# Patient Record
Sex: MALE | Race: WHITE | NOT HISPANIC OR LATINO | Employment: FULL TIME | URBAN - METROPOLITAN AREA
[De-identification: names, ages, dates, MRNs, and addresses within clinical notes are randomized per-mention and may not be internally consistent; named-entity substitution may affect disease eponyms.]

---

## 2021-06-30 ENCOUNTER — HOSPITAL ENCOUNTER (EMERGENCY)
Facility: HOSPITAL | Age: 56
Discharge: HOME | End: 2021-06-30
Attending: EMERGENCY MEDICINE
Payer: MEDICAID

## 2021-06-30 VITALS
TEMPERATURE: 98 F | DIASTOLIC BLOOD PRESSURE: 74 MMHG | OXYGEN SATURATION: 97 % | HEART RATE: 103 BPM | RESPIRATION RATE: 16 BRPM | WEIGHT: 175 LBS | BODY MASS INDEX: 24.5 KG/M2 | HEIGHT: 71 IN | SYSTOLIC BLOOD PRESSURE: 157 MMHG

## 2021-06-30 DIAGNOSIS — J18.9 PNEUMONIA OF LEFT LOWER LOBE DUE TO INFECTIOUS ORGANISM: Primary | ICD-10-CM

## 2021-06-30 LAB
ANION GAP SERPL CALC-SCNC: 13 MMOL/L (ref 8–16)
BASOPHILS # BLD AUTO: 0.04 K/UL (ref 0–0.2)
BASOPHILS NFR BLD: 0.4 % (ref 0–1.9)
BUN SERPL-MCNC: 12 MG/DL (ref 6–20)
CALCIUM SERPL-MCNC: 10 MG/DL (ref 8.7–10.5)
CHLORIDE SERPL-SCNC: 95 MMOL/L (ref 95–110)
CO2 SERPL-SCNC: 23 MMOL/L (ref 23–29)
CREAT SERPL-MCNC: 0.9 MG/DL (ref 0.5–1.4)
DIFFERENTIAL METHOD: ABNORMAL
EOSINOPHIL # BLD AUTO: 0 K/UL (ref 0–0.5)
EOSINOPHIL NFR BLD: 0.4 % (ref 0–8)
ERYTHROCYTE [DISTWIDTH] IN BLOOD BY AUTOMATED COUNT: 12.5 % (ref 11.5–14.5)
EST. GFR  (AFRICAN AMERICAN): >60 ML/MIN/1.73 M^2
EST. GFR  (NON AFRICAN AMERICAN): >60 ML/MIN/1.73 M^2
GLUCOSE SERPL-MCNC: 112 MG/DL (ref 70–110)
HCT VFR BLD AUTO: 46.2 % (ref 40–54)
HGB BLD-MCNC: 15.6 G/DL (ref 14–18)
IMM GRANULOCYTES # BLD AUTO: 0.05 K/UL (ref 0–0.04)
IMM GRANULOCYTES NFR BLD AUTO: 0.5 % (ref 0–0.5)
LACTATE SERPL-SCNC: 1.3 MMOL/L (ref 0.5–2.2)
LYMPHOCYTES # BLD AUTO: 0.5 K/UL (ref 1–4.8)
LYMPHOCYTES NFR BLD: 4.8 % (ref 18–48)
MCH RBC QN AUTO: 32.3 PG (ref 27–31)
MCHC RBC AUTO-ENTMCNC: 33.8 G/DL (ref 32–36)
MCV RBC AUTO: 96 FL (ref 82–98)
MONOCYTES # BLD AUTO: 0.5 K/UL (ref 0.3–1)
MONOCYTES NFR BLD: 5 % (ref 4–15)
NEUTROPHILS # BLD AUTO: 9.3 K/UL (ref 1.8–7.7)
NEUTROPHILS NFR BLD: 88.9 % (ref 38–73)
NRBC BLD-RTO: 0 /100 WBC
PLATELET # BLD AUTO: 246 K/UL (ref 150–450)
PMV BLD AUTO: 9.6 FL (ref 9.2–12.9)
POTASSIUM SERPL-SCNC: 3.9 MMOL/L (ref 3.5–5.1)
RBC # BLD AUTO: 4.83 M/UL (ref 4.6–6.2)
SODIUM SERPL-SCNC: 131 MMOL/L (ref 136–145)
WBC # BLD AUTO: 10.45 K/UL (ref 3.9–12.7)

## 2021-06-30 PROCEDURE — 63600175 PHARM REV CODE 636 W HCPCS: Performed by: EMERGENCY MEDICINE

## 2021-06-30 PROCEDURE — 96374 THER/PROPH/DIAG INJ IV PUSH: CPT

## 2021-06-30 PROCEDURE — 36415 COLL VENOUS BLD VENIPUNCTURE: CPT | Performed by: EMERGENCY MEDICINE

## 2021-06-30 PROCEDURE — 25000003 PHARM REV CODE 250: Performed by: EMERGENCY MEDICINE

## 2021-06-30 PROCEDURE — 80048 BASIC METABOLIC PNL TOTAL CA: CPT | Performed by: EMERGENCY MEDICINE

## 2021-06-30 PROCEDURE — 96361 HYDRATE IV INFUSION ADD-ON: CPT

## 2021-06-30 PROCEDURE — 99284 EMERGENCY DEPT VISIT MOD MDM: CPT | Mod: 25

## 2021-06-30 PROCEDURE — 85025 COMPLETE CBC W/AUTO DIFF WBC: CPT | Performed by: EMERGENCY MEDICINE

## 2021-06-30 PROCEDURE — 83605 ASSAY OF LACTIC ACID: CPT | Performed by: EMERGENCY MEDICINE

## 2021-06-30 RX ORDER — AMOXICILLIN 500 MG/1
1000 CAPSULE ORAL 3 TIMES DAILY
Qty: 30 CAPSULE | Refills: 0 | Status: SHIPPED | OUTPATIENT
Start: 2021-06-30 | End: 2021-07-05

## 2021-06-30 RX ORDER — ACETAMINOPHEN 500 MG
1000 TABLET ORAL
Status: COMPLETED | OUTPATIENT
Start: 2021-06-30 | End: 2021-06-30

## 2021-06-30 RX ORDER — KETOROLAC TROMETHAMINE 30 MG/ML
10 INJECTION, SOLUTION INTRAMUSCULAR; INTRAVENOUS
Status: COMPLETED | OUTPATIENT
Start: 2021-06-30 | End: 2021-06-30

## 2021-06-30 RX ADMIN — KETOROLAC TROMETHAMINE 10 MG: 30 INJECTION, SOLUTION INTRAMUSCULAR; INTRAVENOUS at 12:06

## 2021-06-30 RX ADMIN — ACETAMINOPHEN 1000 MG: 500 TABLET, COATED ORAL at 12:06

## 2021-06-30 RX ADMIN — SODIUM CHLORIDE 1000 ML: 0.9 INJECTION, SOLUTION INTRAVENOUS at 12:06

## 2023-01-26 ENCOUNTER — HOSPITAL ENCOUNTER (EMERGENCY)
Facility: HOSPITAL | Age: 58
Discharge: HOME | End: 2023-01-26
Attending: EMERGENCY MEDICINE
Payer: MEDICAID

## 2023-01-26 VITALS
WEIGHT: 170 LBS | OXYGEN SATURATION: 98 % | HEIGHT: 71 IN | BODY MASS INDEX: 23.8 KG/M2 | RESPIRATION RATE: 17 BRPM | SYSTOLIC BLOOD PRESSURE: 129 MMHG | TEMPERATURE: 98 F | DIASTOLIC BLOOD PRESSURE: 72 MMHG | HEART RATE: 101 BPM
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
## 2023-01-26 PROCEDURE — 99282 EMERGENCY DEPT VISIT SF MDM: CPT
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
## 2023-01-26 NOTE — Clinical Note
"Chepe Lozano" Rupehs was seen and treated in our emergency department on 1/26/2023.  He may return to work on 01/27/2023.       If you have any questions or concerns, please don't hesitate to call.      Martina Solorio PA-C"

## 2023-05-28 ENCOUNTER — OFFICE VISIT (OUTPATIENT)
Dept: URGENT CARE | Facility: CLINIC | Age: 58
End: 2023-05-28
Payer: MEDICAID

## 2023-05-28 VITALS
DIASTOLIC BLOOD PRESSURE: 81 MMHG | TEMPERATURE: 97 F | BODY MASS INDEX: 22.96 KG/M2 | SYSTOLIC BLOOD PRESSURE: 140 MMHG | OXYGEN SATURATION: 96 % | WEIGHT: 164 LBS | RESPIRATION RATE: 16 BRPM | HEART RATE: 110 BPM | HEIGHT: 71 IN

## 2023-05-28 DIAGNOSIS — S61.412A LACERATION OF LEFT HAND, FOREIGN BODY PRESENCE UNSPECIFIED, INITIAL ENCOUNTER: Primary | ICD-10-CM

## 2023-05-28 PROCEDURE — 99213 OFFICE O/P EST LOW 20 MIN: CPT | Mod: S$GLB,,, | Performed by: NURSE PRACTITIONER

## 2023-05-28 PROCEDURE — 99213 PR OFFICE/OUTPT VISIT, EST, LEVL III, 20-29 MIN: ICD-10-PCS | Mod: S$GLB,,, | Performed by: NURSE PRACTITIONER

## 2023-05-28 RX ORDER — MUPIROCIN 20 MG/G
OINTMENT TOPICAL 3 TIMES DAILY
Qty: 22 G | Refills: 0 | Status: SHIPPED | OUTPATIENT
Start: 2023-05-28 | End: 2023-06-04

## 2023-05-28 NOTE — PATIENT INSTRUCTIONS
Keep wound clean and covered until healed  Wash wound with clean, soapy water and change dressing daily  Apply mupirocin ointment 2-3 x daily and with dressing changes  Follow up with PCP  Go directly to the er for any evidence of infection  Return to clinic for new concerns.

## 2023-05-28 NOTE — PROGRESS NOTES
"Subjective:      Patient ID: Chepe Goddard is a 57 y.o. male.    Vitals:  height is 5' 11" (1.803 m) and weight is 74.4 kg (164 lb). His temperature is 97 °F (36.1 °C). His blood pressure is 140/81 (abnormal) and his pulse is 110. His respiration is 16 and oxygen saturation is 96%.     Chief Complaint: Letter for School/Work    57-year-old female seen today requesting a return to work excuse.  States approximately 1 week ago he was cutting some bamboo with a bush knife and struck his left hand.  Denies seeking treatment prior to today and treated wound at home.  States last Tdap was about a year ago.    Constitution: Negative for chills and fever.   Cardiovascular:  Negative for chest pain, palpitations and sob on exertion.   Respiratory:  Negative for shortness of breath and stridor.    Musculoskeletal:  Negative for pain.   Skin:  Positive for laceration and erythema. Negative for wound.   Neurological:  Negative for dizziness, light-headedness, passing out, disorientation and altered mental status.   Psychiatric/Behavioral:  Negative for altered mental status, disorientation and confusion.     Objective:     Physical Exam   Constitutional: He is oriented to person, place, and time. He appears well-developed. He is cooperative.  Non-toxic appearance. He does not appear ill. No distress.   HENT:   Head: Normocephalic and atraumatic.   Ears:   Right Ear: External ear normal.   Left Ear: External ear normal.   Nose: Nose normal.   Mouth/Throat: Oropharynx is clear and moist and mucous membranes are normal. Mucous membranes are moist. Oropharynx is clear.   Eyes: Conjunctivae and lids are normal. No scleral icterus.   Neck: Trachea normal and phonation normal. Neck supple.   Cardiovascular: Normal rate, regular rhythm, normal heart sounds and normal pulses.   Pulmonary/Chest: Effort normal and breath sounds normal. No stridor. No respiratory distress.   Abdominal: Normal appearance.   Musculoskeletal:       "   General: No deformity.      Left hand: He exhibits normal capillary refill. Motor /Testing: The patient has normal left wrist strength.        Hands:    Neurological: no focal deficit. He is alert and oriented to person, place, and time. He has normal strength and normal reflexes. No sensory deficit.   Skin: Skin is warm, dry, intact and not diaphoretic. Capillary refill takes 2 to 3 seconds. erythema   Psychiatric: His speech is normal and behavior is normal. Judgment and thought content normal.   Nursing note and vitals reviewed.    Assessment:     1. Laceration of left hand, foreign body presence unspecified, initial encounter        Plan:       Laceration of left hand, foreign body presence unspecified, initial encounter  -     mupirocin (BACTROBAN) 2 % ointment; Apply topically 3 (three) times daily. for 7 days  Dispense: 22 g; Refill: 0      I have discussed the physical exam findings with the patient. He is requesting to return to work. We discussed symptom monitoring, conservative care methods, wound care and hygiene instructions, medication use, and follow up orders. He verbalized understanding and agreement with the plan of care.

## 2023-05-28 NOTE — LETTER
May 28, 2023      [facility]  [address]  [place] [address]  Phone: [phone]       Patient: Chepe Goddard   YOB: 1965  Date of Visit: 05/28/2023    To Whom It May Concern:    Catie Goddard  was at [facility] for initial visit on 05/28/2023 requesting a work excuse. The patient has no obvious limiting injury and may return to work on 05/28/2023 with no restrictions. If you have any questions or concerns, or if I can be of further assistance, please do not hesitate to contact me.    Sincerely,    Garrett Khalil Jr., KELBYP-C

## 2024-08-26 ENCOUNTER — HOSPITAL ENCOUNTER (OUTPATIENT)
Facility: HOSPITAL | Age: 59
Discharge: HOME | End: 2024-08-27
Attending: EMERGENCY MEDICINE | Admitting: INTERNAL MEDICINE

## 2024-08-26 DIAGNOSIS — F10.10 ALCOHOL ABUSE: ICD-10-CM

## 2024-08-26 DIAGNOSIS — F17.200 NEEDS SMOKING CESSATION EDUCATION: ICD-10-CM

## 2024-08-26 DIAGNOSIS — F19.10 SUBSTANCE ABUSE: ICD-10-CM

## 2024-08-26 DIAGNOSIS — R07.9 CHEST PAIN: ICD-10-CM

## 2024-08-26 DIAGNOSIS — R56.9 SEIZURES: ICD-10-CM

## 2024-08-26 DIAGNOSIS — R40.20 LOSS OF CONSCIOUSNESS: Primary | ICD-10-CM

## 2024-08-26 PROBLEM — F10.20 ALCOHOLISM: Status: ACTIVE | Noted: 2024-08-26

## 2024-08-26 LAB
ALBUMIN SERPL BCP-MCNC: 4.1 G/DL (ref 3.5–5.2)
ALP SERPL-CCNC: 67 U/L (ref 55–135)
ALT SERPL W/O P-5'-P-CCNC: 10 U/L (ref 10–44)
AMPHET+METHAMPHET UR QL: ABNORMAL
ANION GAP SERPL CALC-SCNC: 11 MMOL/L (ref 8–16)
APAP SERPL-MCNC: 0.1 UG/ML (ref 10–20)
AST SERPL-CCNC: 15 U/L (ref 10–40)
BARBITURATES UR QL SCN>200 NG/ML: NEGATIVE
BASOPHILS # BLD AUTO: 0.06 K/UL (ref 0–0.2)
BASOPHILS NFR BLD: 0.6 % (ref 0–1.9)
BENZODIAZ UR QL SCN>200 NG/ML: NEGATIVE
BILIRUB SERPL-MCNC: 0.3 MG/DL (ref 0.1–1)
BILIRUB UR QL STRIP: NEGATIVE
BNP SERPL-MCNC: 12 PG/ML (ref 0–99)
BUN SERPL-MCNC: 16 MG/DL (ref 6–20)
BZE UR QL SCN: NEGATIVE
CALCIUM SERPL-MCNC: 9 MG/DL (ref 8.7–10.5)
CANNABINOIDS UR QL SCN: ABNORMAL
CHLORIDE SERPL-SCNC: 107 MMOL/L (ref 95–110)
CK SERPL-CCNC: 36 U/L (ref 20–200)
CLARITY UR: CLEAR
CO2 SERPL-SCNC: 23 MMOL/L (ref 23–29)
COLOR UR: YELLOW
CREAT SERPL-MCNC: 1 MG/DL (ref 0.5–1.4)
CREAT UR-MCNC: 246.4 MG/DL (ref 23–375)
DIFFERENTIAL METHOD BLD: ABNORMAL
EOSINOPHIL # BLD AUTO: 0.1 K/UL (ref 0–0.5)
EOSINOPHIL NFR BLD: 0.5 % (ref 0–8)
ERYTHROCYTE [DISTWIDTH] IN BLOOD BY AUTOMATED COUNT: 13.6 % (ref 11.5–14.5)
EST. GFR  (NO RACE VARIABLE): >60 ML/MIN/1.73 M^2
ETHANOL SERPL-MCNC: <10 MG/DL
GLUCOSE SERPL-MCNC: 113 MG/DL (ref 70–110)
GLUCOSE UR QL STRIP: ABNORMAL
HCT VFR BLD AUTO: 46.6 % (ref 40–54)
HGB BLD-MCNC: 15 G/DL (ref 14–18)
HGB UR QL STRIP: NEGATIVE
IMM GRANULOCYTES # BLD AUTO: 0.03 K/UL (ref 0–0.04)
IMM GRANULOCYTES NFR BLD AUTO: 0.3 % (ref 0–0.5)
INR PPP: 0.9 (ref 0.8–1.2)
KETONES UR QL STRIP: ABNORMAL
LEUKOCYTE ESTERASE UR QL STRIP: NEGATIVE
LYMPHOCYTES # BLD AUTO: 1.3 K/UL (ref 1–4.8)
LYMPHOCYTES NFR BLD: 13 % (ref 18–48)
MAGNESIUM SERPL-MCNC: 2.2 MG/DL (ref 1.6–2.6)
MCH RBC QN AUTO: 30.1 PG (ref 27–31)
MCHC RBC AUTO-ENTMCNC: 32.2 G/DL (ref 32–36)
MCV RBC AUTO: 94 FL (ref 82–98)
MONOCYTES # BLD AUTO: 0.6 K/UL (ref 0.3–1)
MONOCYTES NFR BLD: 5.7 % (ref 4–15)
NEUTROPHILS # BLD AUTO: 8.1 K/UL (ref 1.8–7.7)
NEUTROPHILS NFR BLD: 79.9 % (ref 38–73)
NITRITE UR QL STRIP: NEGATIVE
NRBC BLD-RTO: 0 /100 WBC
OPIATES UR QL SCN: NEGATIVE
PCP UR QL SCN>25 NG/ML: NEGATIVE
PH UR STRIP: 6 [PH] (ref 5–8)
PHOSPHATE SERPL-MCNC: 2.9 MG/DL (ref 2.7–4.5)
PLATELET # BLD AUTO: 348 K/UL (ref 150–450)
PMV BLD AUTO: 9.9 FL (ref 9.2–12.9)
POTASSIUM SERPL-SCNC: 4.3 MMOL/L (ref 3.5–5.1)
PROT SERPL-MCNC: 6.9 G/DL (ref 6–8.4)
PROT UR QL STRIP: ABNORMAL
PROTHROMBIN TIME: 10.3 SEC (ref 9–12.5)
RBC # BLD AUTO: 4.98 M/UL (ref 4.6–6.2)
SALICYLATES SERPL-MCNC: <1.5 MG/DL (ref 15–30)
SARS-COV-2 RDRP RESP QL NAA+PROBE: NEGATIVE
SODIUM SERPL-SCNC: 141 MMOL/L (ref 136–145)
SP GR UR STRIP: >1.03 (ref 1–1.03)
TOXICOLOGY INFORMATION: ABNORMAL
TROPONIN I SERPL HS-MCNC: 3.8 PG/ML (ref 0–14.9)
TSH SERPL DL<=0.005 MIU/L-ACNC: 1.22 UIU/ML (ref 0.34–5.6)
URN SPEC COLLECT METH UR: ABNORMAL
UROBILINOGEN UR STRIP-ACNC: NEGATIVE EU/DL
WBC # BLD AUTO: 10.17 K/UL (ref 3.9–12.7)

## 2024-08-26 PROCEDURE — 25000003 PHARM REV CODE 250: Performed by: INTERNAL MEDICINE

## 2024-08-26 PROCEDURE — 93010 ELECTROCARDIOGRAM REPORT: CPT | Mod: ,,, | Performed by: INTERNAL MEDICINE

## 2024-08-26 PROCEDURE — 84484 ASSAY OF TROPONIN QUANT: CPT | Performed by: EMERGENCY MEDICINE

## 2024-08-26 PROCEDURE — 80143 DRUG ASSAY ACETAMINOPHEN: CPT | Performed by: EMERGENCY MEDICINE

## 2024-08-26 PROCEDURE — 83880 ASSAY OF NATRIURETIC PEPTIDE: CPT | Performed by: EMERGENCY MEDICINE

## 2024-08-26 PROCEDURE — 80053 COMPREHEN METABOLIC PANEL: CPT | Performed by: EMERGENCY MEDICINE

## 2024-08-26 PROCEDURE — 84100 ASSAY OF PHOSPHORUS: CPT | Performed by: EMERGENCY MEDICINE

## 2024-08-26 PROCEDURE — 63600175 PHARM REV CODE 636 W HCPCS: Performed by: INTERNAL MEDICINE

## 2024-08-26 PROCEDURE — 82077 ASSAY SPEC XCP UR&BREATH IA: CPT | Performed by: EMERGENCY MEDICINE

## 2024-08-26 PROCEDURE — G0378 HOSPITAL OBSERVATION PER HR: HCPCS

## 2024-08-26 PROCEDURE — 80307 DRUG TEST PRSMV CHEM ANLYZR: CPT | Performed by: EMERGENCY MEDICINE

## 2024-08-26 PROCEDURE — 99285 EMERGENCY DEPT VISIT HI MDM: CPT | Mod: 25

## 2024-08-26 PROCEDURE — 81003 URINALYSIS AUTO W/O SCOPE: CPT | Mod: 59 | Performed by: EMERGENCY MEDICINE

## 2024-08-26 PROCEDURE — 85025 COMPLETE CBC W/AUTO DIFF WBC: CPT | Performed by: EMERGENCY MEDICINE

## 2024-08-26 PROCEDURE — A9585 GADOBUTROL INJECTION: HCPCS | Performed by: INTERNAL MEDICINE

## 2024-08-26 PROCEDURE — 25500020 PHARM REV CODE 255: Performed by: INTERNAL MEDICINE

## 2024-08-26 PROCEDURE — 83735 ASSAY OF MAGNESIUM: CPT | Performed by: EMERGENCY MEDICINE

## 2024-08-26 PROCEDURE — 84443 ASSAY THYROID STIM HORMONE: CPT | Performed by: EMERGENCY MEDICINE

## 2024-08-26 PROCEDURE — 96374 THER/PROPH/DIAG INJ IV PUSH: CPT

## 2024-08-26 PROCEDURE — U0002 COVID-19 LAB TEST NON-CDC: HCPCS | Performed by: EMERGENCY MEDICINE

## 2024-08-26 PROCEDURE — 80179 DRUG ASSAY SALICYLATE: CPT | Performed by: EMERGENCY MEDICINE

## 2024-08-26 PROCEDURE — 85610 PROTHROMBIN TIME: CPT | Performed by: EMERGENCY MEDICINE

## 2024-08-26 PROCEDURE — 82550 ASSAY OF CK (CPK): CPT | Performed by: EMERGENCY MEDICINE

## 2024-08-26 PROCEDURE — 93005 ELECTROCARDIOGRAM TRACING: CPT | Performed by: INTERNAL MEDICINE

## 2024-08-26 PROCEDURE — 36415 COLL VENOUS BLD VENIPUNCTURE: CPT | Performed by: EMERGENCY MEDICINE

## 2024-08-26 RX ORDER — NALOXONE HCL 0.4 MG/ML
0.02 VIAL (ML) INJECTION
Status: DISCONTINUED | OUTPATIENT
Start: 2024-08-26 | End: 2024-08-27 | Stop reason: HOSPADM

## 2024-08-26 RX ORDER — LANOLIN ALCOHOL/MO/W.PET/CERES
800 CREAM (GRAM) TOPICAL
Status: DISCONTINUED | OUTPATIENT
Start: 2024-08-26 | End: 2024-08-27 | Stop reason: HOSPADM

## 2024-08-26 RX ORDER — ACETAMINOPHEN 325 MG/1
650 TABLET ORAL EVERY 4 HOURS PRN
Status: DISCONTINUED | OUTPATIENT
Start: 2024-08-26 | End: 2024-08-27 | Stop reason: HOSPADM

## 2024-08-26 RX ORDER — HYDROCODONE BITARTRATE AND ACETAMINOPHEN 5; 325 MG/1; MG/1
1 TABLET ORAL EVERY 6 HOURS PRN
Status: DISCONTINUED | OUTPATIENT
Start: 2024-08-26 | End: 2024-08-27 | Stop reason: HOSPADM

## 2024-08-26 RX ORDER — ALUMINUM HYDROXIDE, MAGNESIUM HYDROXIDE, AND SIMETHICONE 1200; 120; 1200 MG/30ML; MG/30ML; MG/30ML
30 SUSPENSION ORAL 4 TIMES DAILY PRN
Status: DISCONTINUED | OUTPATIENT
Start: 2024-08-26 | End: 2024-08-27 | Stop reason: HOSPADM

## 2024-08-26 RX ORDER — LORAZEPAM 2 MG/ML
1 INJECTION INTRAMUSCULAR
Status: DISCONTINUED | OUTPATIENT
Start: 2024-08-26 | End: 2024-08-27 | Stop reason: HOSPADM

## 2024-08-26 RX ORDER — TALC
6 POWDER (GRAM) TOPICAL NIGHTLY PRN
Status: DISCONTINUED | OUTPATIENT
Start: 2024-08-26 | End: 2024-08-27 | Stop reason: HOSPADM

## 2024-08-26 RX ORDER — SODIUM,POTASSIUM PHOSPHATES 280-250MG
2 POWDER IN PACKET (EA) ORAL
Status: DISCONTINUED | OUTPATIENT
Start: 2024-08-26 | End: 2024-08-27 | Stop reason: HOSPADM

## 2024-08-26 RX ORDER — GADOBUTROL 604.72 MG/ML
7.5 INJECTION INTRAVENOUS
Status: COMPLETED | OUTPATIENT
Start: 2024-08-26 | End: 2024-08-26

## 2024-08-26 RX ORDER — ACETAMINOPHEN 325 MG/1
650 TABLET ORAL EVERY 8 HOURS PRN
Status: DISCONTINUED | OUTPATIENT
Start: 2024-08-26 | End: 2024-08-26

## 2024-08-26 RX ORDER — FAMOTIDINE 20 MG/1
20 TABLET, FILM COATED ORAL 2 TIMES DAILY
Status: DISCONTINUED | OUTPATIENT
Start: 2024-08-26 | End: 2024-08-27 | Stop reason: HOSPADM

## 2024-08-26 RX ORDER — ONDANSETRON HYDROCHLORIDE 2 MG/ML
4 INJECTION, SOLUTION INTRAVENOUS EVERY 6 HOURS PRN
Status: DISCONTINUED | OUTPATIENT
Start: 2024-08-26 | End: 2024-08-27 | Stop reason: HOSPADM

## 2024-08-26 RX ADMIN — FAMOTIDINE 20 MG: 20 TABLET ORAL at 09:08

## 2024-08-26 RX ADMIN — FOLIC ACID: 5 INJECTION, SOLUTION INTRAMUSCULAR; INTRAVENOUS; SUBCUTANEOUS at 10:08

## 2024-08-26 RX ADMIN — GADOBUTROL 7.5 ML: 604.72 INJECTION INTRAVENOUS at 07:08

## 2024-08-26 NOTE — ASSESSMENT & PLAN NOTE
Suspected seizures  MRI W WO Contrast  EEG  Neurology Consult  Seizure precautions  Possible discharge from hospital within 24 hrs

## 2024-08-26 NOTE — FIRST PROVIDER EVALUATION
"Medical screening examination initiated.  I have conducted a focused provider triage encounter, findings are as follows:    Brief history of present illness:  Patient with syncope versus seizure-like activity occurring over the last couple of days    Vitals:    08/26/24 1025   BP: 104/62   Pulse: 98   Resp: 18   Temp: 97.2 °F (36.2 °C)   TempSrc: Oral   SpO2: 99%   Weight: 77.1 kg (170 lb)   Height: 5' 11" (1.803 m)       Pertinent physical exam:  No obvious focal deficits    Brief workup plan:  Syncope/seizure workup    Preliminary workup initiated; this workup will be continued and followed by the physician or advanced practice provider that is assigned to the patient when roomed.  "

## 2024-08-26 NOTE — H&P
[facility] - Emergency Dept  Hospital Medicine  History & Physical    Patient Name: Chepe Goddard  MRN: [id number]  Patient Class: OP- Observation  Admission Date: 8/26/2024  Attending Physician: Blake Cronin MD   Primary Care Provider: Armida, Primary Doctor         Patient information was obtained from patient and ER records.     Subjective:     Principal Problem:Seizures    Chief Complaint:   Chief Complaint   Patient presents with    Loss of Consciousness     Yesterday patient has one syncope episode with no postictal symptoms upon waking up - patient does not remember the event         HPI: 58 year old pt getting admitted with suspected seizures'  Pt drinks one pint of vodka everyday  Also has been using crystal meth everyday for past 9 years  Said he quit taking crystal meth for more than 1 week  But today UDS is positive again for amphetamines  He was taking OTC stackers/caffeine drinks for past few days to stay awake  Yesterday he passed out and this was witnessed by Girlfriend  EMS was called but pt refused ER visit  Toady he was found to be more sleepy and EMS was again called  Pt was escorted to ER and will be admitted to r/o seizures   Pt doesn't recall most of the events happened yesterday and today    Past Medical History:   Diagnosis Date    Alcoholism        Past Surgical History:   Procedure Laterality Date    NO PAST SURGERIES         Review of patient's allergies indicates:  No Known Allergies    No current facility-administered medications on file prior to encounter.     No current outpatient medications on file prior to encounter.     Family History       Problem Relation (Age of Onset)    Hypertension Mother          Tobacco Use    Smoking status: Every Day     Current packs/day: 1.00     Average packs/day: 1 pack/day for 0.7 years (0.7 ttl pk-yrs)     Types: Cigarettes     Start date: 1/1/2024    Smokeless tobacco: Not on file   Substance and Sexual Activity    Alcohol use:  Yes     Comment: occasionally    Drug use: Not on file    Sexual activity: Not on file     Review of Systems   Constitutional:  Negative for activity change and appetite change.   HENT:  Negative for congestion and dental problem.    Eyes:  Negative for discharge and itching.   Respiratory:  Negative for shortness of breath.    Cardiovascular:  Negative for chest pain.   Gastrointestinal:  Negative for abdominal distention and abdominal pain.   Endocrine: Negative for cold intolerance.   Genitourinary:  Negative for difficulty urinating and dysuria.   Musculoskeletal:  Negative for arthralgias and back pain.   Skin:  Negative for color change.   Neurological:  Negative for dizziness and facial asymmetry.   Hematological:  Negative for adenopathy.   Psychiatric/Behavioral:  Negative for agitation and behavioral problems.      Objective:     Vital Signs (Most Recent):  Temp: 97.2 °F (36.2 °C) (08/26/24 1025)  Pulse: 81 (08/26/24 1530)  Resp: 13 (08/26/24 1500)  BP: 132/63 (08/26/24 1530)  SpO2: 96 % (08/26/24 1530) Vital Signs (24h Range):  Temp:  [97.2 °F (36.2 °C)] 97.2 °F (36.2 °C)  Pulse:  [77-98] 81  Resp:  [13-18] 13  SpO2:  [94 %-100 %] 96 %  BP: (104-182)/(56-80) 132/63     Weight: 77.1 kg (169 lb 15.6 oz)  Body mass index is 23.71 kg/m².     Physical Exam  Vitals and nursing note reviewed.   Constitutional:       Appearance: He is well-developed.   HENT:      Head: Atraumatic.      Right Ear: External ear normal.      Left Ear: External ear normal.      Nose: Nose normal.      Mouth/Throat:      Mouth: Mucous membranes are dry.   Cardiovascular:      Rate and Rhythm: Normal rate.   Pulmonary:      Effort: Pulmonary effort is normal.   Abdominal:      Palpations: Abdomen is soft.   Musculoskeletal:         General: Normal range of motion.      Cervical back: Full passive range of motion without pain and normal range of motion.   Skin:     General: Skin is warm.   Neurological:      Mental Status: He is alert  and oriented to person, place, and time.   Psychiatric:         Behavior: Behavior normal.                Significant Labs: All pertinent labs within the past 24 hours have been reviewed.  CBC:   Recent Labs   Lab 08/26/24  1051   WBC 10.17   HGB 15.0   HCT 46.6        CMP:   Recent Labs   Lab 08/26/24  1051      K 4.3      CO2 23   *   BUN 16   CREATININE 1.0   CALCIUM 9.0   PROT 6.9   ALBUMIN 4.1   BILITOT 0.3   ALKPHOS 67   AST 15   ALT 10   ANIONGAP 11       Significant Imaging: I have reviewed all pertinent imaging results/findings within the past 24 hours.    Assessment/Plan:     * Seizures  Suspected seizures  MRI W WO Contrast  EEG  Neurology Consult  Seizure precautions  Possible discharge from hospital within 24 hrs      Alcoholism  CIWA score  Banana bag everyday       Polysubstance abuse  Aware         VTE Risk Mitigation (From admission, onward)           Ordered     IP VTE LOW RISK PATIENT  Once         08/26/24 1747     Place sequential compression device  Until discontinued         08/26/24 1747                       On 08/26/2024, patient should be placed in hospital observation services under my care.             Blake Cronin MD  Department of Hospital Medicine  [facility] - Emergency Dept

## 2024-08-26 NOTE — ED PROVIDER NOTES
"Encounter Date: 8/26/2024       History     Chief Complaint   Patient presents with    Loss of Consciousness     Yesterday patient has one syncope episode with no postictal symptoms upon waking up - patient does not remember the event      58-year-old male presents emergency department with reported seizure activity that occurred yesterday.  According to the patient he states that he drinks a proximally 1 pt of vodka daily mixed with Powerade he denies having any previous DTs, denies having to wake up and have a drink in the morning prior to going to work.  Patient reports that he does have a job in his able to perform his job daily.  The patient reports that he has not had any alcohol in the last 24 hours.  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  The patient has been supplementing his lack of crystal meth use with over-the-counter " stackers".  According to the patient's significant other he was sitting down at the table yesterday when he suddenly started grunting and his limbs were shaking, he had an episode of urinary incontinence, 911 was called however upon their arrival with the patient was awake and alert and refused transport and went to sleep.  According to the significant other he slept all night states that he got up this morning was attempting to go to work when he reported that he felt dizzy and felt as if something else were happening to him.  His significant other reports that he turned around suddenly and ran into the wall she helped him to the floor he did not have a complete syncopal episode nor any seizure-like activity however the patient does not remember all events.    The history is provided by the patient.     Review of patient's allergies indicates:  No Known Allergies  History reviewed. No pertinent past medical history.  History reviewed. No pertinent surgical " history.  No family history on file.  Social History     Tobacco Use    Smoking status: Every Day     Current packs/day: 1.00     Average packs/day: 1 pack/day for 0.7 years (0.7 ttl pk-yrs)     Types: Cigarettes     Start date: 1/1/2024   Substance Use Topics    Alcohol use: Yes     Comment: occasionally     Review of Systems   Constitutional:  Negative for chills and fever.   HENT: Negative.     Respiratory: Negative.     Cardiovascular: Negative.    Gastrointestinal: Negative.    Genitourinary: Negative.    Neurological:  Positive for dizziness and seizures.   Hematological: Negative.    Psychiatric/Behavioral: Negative.     All other systems reviewed and are negative.      Physical Exam     Initial Vitals [08/26/24 1025]   BP Pulse Resp Temp SpO2   104/62 98 18 97.2 °F (36.2 °C) 99 %      MAP       --         Physical Exam    Constitutional: He appears well-developed and well-nourished. He appears lethargic.   HENT:   Head: Normocephalic and atraumatic.   Eyes: EOM are normal. Pupils are equal, round, and reactive to light.   Pulmonary/Chest: Breath sounds normal.   Abdominal: Abdomen is soft. Bowel sounds are normal.     Neurological: He has normal strength. He appears lethargic. No sensory deficit. Coordination and gait normal. GCS eye subscore is 4. GCS verbal subscore is 5. GCS motor subscore is 6.         ED Course   Procedures  Labs Reviewed   CBC W/ AUTO DIFFERENTIAL - Abnormal       Result Value    WBC 10.17      RBC 4.98      Hemoglobin 15.0      Hematocrit 46.6      MCV 94      MCH 30.1      MCHC 32.2      RDW 13.6      Platelets 348      MPV 9.9      Immature Granulocytes 0.3      Gran # (ANC) 8.1 (*)     Immature Grans (Abs) 0.03      Lymph # 1.3      Mono # 0.6      Eos # 0.1      Baso # 0.06      nRBC 0      Gran % 79.9 (*)     Lymph % 13.0 (*)     Mono % 5.7      Eosinophil % 0.5      Basophil % 0.6      Differential Method Automated     COMPREHENSIVE METABOLIC PANEL - Abnormal    Sodium 141       Potassium 4.3      Chloride 107      CO2 23      Glucose 113 (*)     BUN 16      Creatinine 1.0      Calcium 9.0      Total Protein 6.9      Albumin 4.1      Total Bilirubin 0.3      Alkaline Phosphatase 67      AST 15      ALT 10      eGFR >60.0      Anion Gap 11     URINALYSIS, REFLEX TO URINE CULTURE - Abnormal    Specimen UA Urine, Clean Catch      Color, UA Yellow      Appearance, UA Clear      pH, UA 6.0      Specific Gravity, UA >1.030 (*)     Protein, UA Trace (*)     Glucose, UA 1+ (*)     Ketones, UA Trace (*)     Bilirubin (UA) Negative      Occult Blood UA Negative      Nitrite, UA Negative      Urobilinogen, UA Negative      Leukocytes, UA Negative      Narrative:     Specimen Source->Urine   DRUG SCREEN PANEL, URINE EMERGENCY - Abnormal    Benzodiazepines Negative      Cocaine (Metab.) Negative      Opiate Scrn, Ur Negative      Barbiturate Screen, Ur Negative      Amphetamine Screen, Ur Presumptive Positive (*)     THC Presumptive Positive (*)     Phencyclidine Negative      Creatinine, Urine 246.4      Toxicology Information SEE COMMENT      Narrative:     Specimen Source->Urine   ACETAMINOPHEN LEVEL - Abnormal    Acetaminophen (Tylenol), Serum 0.1 (*)    SALICYLATE LEVEL - Abnormal    Salicylate Lvl <1.5 (*)    PROTIME-INR    Prothrombin Time 10.3      INR 0.9     TSH    TSH 1.219     TROPONIN I HIGH SENSITIVITY    Troponin I High Sensitivity 3.8     SARS-COV-2 RNA AMPLIFICATION, QUAL    SARS-CoV-2 RNA, Amplification, Qual Negative     B-TYPE NATRIURETIC PEPTIDE    BNP 12     CK    CPK 36     ALCOHOL,MEDICAL (ETHANOL)   ACETAMINOPHEN LEVEL   ALCOHOL,MEDICAL (ETHANOL)    Alcohol, Serum <10     MAGNESIUM   PHOSPHORUS   PHOSPHORUS    Phosphorus 2.9     MAGNESIUM    Magnesium 2.2     SALICYLATE LEVEL        ECG Results              ECG 12 lead (In process)        Collection Time Result Time QRS Duration OHS QTC Calculation    08/26/24 11:15:12 08/26/24 13:19:11 84 447                     In process by  Interface, Lab In [facility] (08/26/24 13:19:14)                   Narrative:    Test Reason : R55,    Vent. Rate : 080 BPM     Atrial Rate : 080 BPM     P-R Int : 146 ms          QRS Dur : 084 ms      QT Int : 388 ms       P-R-T Axes : 073 078 101 degrees     QTc Int : 447 ms    Normal sinus rhythm  Normal ECG  No previous ECGs available    Referred By: [email or web address]   SELF           Confirmed By:                                   Imaging Results              CT Head Without Contrast (Final result)  Result time 08/26/24 11:06:54      Final result by Lidya Calix MD (08/26/24 11:06:54)                   Impression:      1. Normal CT appearance of the brain.      Electronically signed by: Lidya Calix  Date:    08/26/2024  Time:    11:06               Narrative:    EXAMINATION:  CT HEAD WITHOUT CONTRAST    CLINICAL HISTORY:  Neuro deficit, acute, stroke suspected;.    TECHNIQUE:  CMS MANDATED QUALITY DATA - CT RADIATION - 436    All CT scans at this facility utilize dose modulation, iterative reconstruction, and/or weight based dosing when appropriate to reduce radiation dose to as low as reasonably achievable.    Non infusion images were obtained from the skull base to the vertex.    COMPARISON:  None.    FINDINGS:  There is no evidence of intracranial mass, hemorrhage, or midline shift.  The ventricles and sulci are within normal limits.  There are no pathologic extra-axial fluid collections.    There is no evidence of ischemic change or edema.  Cerebellum and brainstem are unremarkable.    The calvarium is intact.                                       X-Ray Chest PA And Lateral (Final result)  Result time 08/26/24 10:56:07   Procedure changed from X-Ray Chest AP Portable     Final result by Lidya Calix MD (08/26/24 10:56:07)                   Impression:      Normal chest.      Electronically signed by: Lidya Calix  Date:    08/26/2024  Time:    10:56               Narrative:    EXAMINATION:  XR  CHEST PA AND LATERAL    CLINICAL HISTORY:  pain;Stroke;    FINDINGS:  PA and lateral chest is compared to 06/30/2021 shows normal cardiomediastinal silhouette.    Lungs are clear. Pulmonary vasculature is normal. No acute osseous abnormality.                                       Medications   melatonin tablet 6 mg (has no administration in time range)   ondansetron injection 4 mg (has no administration in time range)   aluminum-magnesium hydroxide-simethicone 200-200-20 mg/5 mL suspension 30 mL (has no administration in time range)   acetaminophen tablet 650 mg (has no administration in time range)   HYDROcodone-acetaminophen 5-325 mg per tablet 1 tablet (has no administration in time range)   naloxone 0.4 mg/mL injection 0.02 mg (has no administration in time range)   potassium bicarbonate disintegrating tablet 50 mEq (has no administration in time range)   potassium bicarbonate disintegrating tablet 35 mEq (has no administration in time range)   potassium bicarbonate disintegrating tablet 60 mEq (has no administration in time range)   magnesium oxide tablet 800 mg (has no administration in time range)   magnesium oxide tablet 800 mg (has no administration in time range)   potassium, sodium phosphates 280-160-250 mg packet 2 packet (has no administration in time range)   potassium, sodium phosphates 280-160-250 mg packet 2 packet (has no administration in time range)   potassium, sodium phosphates 280-160-250 mg packet 2 packet (has no administration in time range)   0.9% NaCl 1,000 mL with mvi, (ADULT) no.4 with vit K 3,300 unit- 150 mcg/10 mL 10 mL, thiamine 100 mg, folic acid 1 mg infusion (has no administration in time range)     Medical Decision Making  58-year-old male presents emergency department with reported seizure activity that occurred yesterday.  According to the patient he states that he drinks a proximally 1 pt of vodka daily mixed with Powerade he denies having any previous DTs, denies having to  "wake up and have a drink in the morning prior to going to work.  Patient reports that he does have a job in his able to perform his job daily.  The patient reports that he has not had any alcohol in the last 24 hours.  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  The patient has been supplementing his lack of crystal meth use with over-the-counter " stackers".  According to the patient's significant other he was sitting down at the table yesterday when he suddenly started grunting and his limbs were shaking, he had an episode of urinary incontinence, 911 was called however upon their arrival with the patient was awake and alert and refused transport and went to sleep.  According to the significant other he slept all night states that he got up this morning was attempting to go to work when he reported that he felt dizzy and felt as if something else were happening to him.  His significant other reports that he turned around suddenly and ran into the wall she helped him to the floor he did not have a complete syncopal episode nor any seizure-like activity however the patient does not remember all events.    The history is provided by the patient.     Considerations include but not limited to, DTs, alcohol abuse, new onset seizure, substance abuse, electrolyte abnormalities    58-year-old male presents emergency department with seizure-like activity that started yesterday had 1 occurrence last p.m. with incontinence of urine.  Patient does admit to daily alcohol use consisting of a pt of vodka he reports that he has had no alcohol in the last 24 hours and has had no previous DTs.  Patient currently not exhibiting any DT symptoms he has no tachycardia no fever, no tremors.  Patient has been somewhat lethargic while in the emergency department however he awakens easily with verbal stimulus he is awake " "alert oriented he has a witnessed steady gait he has no focal neuro deficits.  Further evaluation was taken in the emergency department secondary to new onset seizure history as well as a history of alcohol use, and substance abuse.  Patient does admit to using crystal meth for proximally 10 years he reports that he snores his crystal meth he states he has not taken any crystal crystal meth use with over-the-counter "stackers" ; however, patient's urine drug screen is positive for amphetamines and marijuana.  Patient's labs are unremarkable including no electrolyte abnormalities again urine tox is positive for amphetamines and marijuana, CT imaging of the brain is unremarkable.  Patient has had no seizure-like activity while in the emergency department.  I did consult Dr. Saunders neurology on-call, who would like the patient admitted for further evaluation including MRI of the brain with and without contrast.  I have given report to Hospital Medicine Dr. Cronin who will admit the patient.    Amount and/or Complexity of Data Reviewed  Labs: ordered. Decision-making details documented in ED Course.  Radiology: ordered. Decision-making details documented in ED Course.                                      Clinical Impression:  Final diagnoses:  [R40.20] Loss of consciousness (Primary)  [F19.10] Substance abuse  [F10.10] Alcohol abuse  [R56.9] Seizures          ED Disposition Condition    Observation                 Elizabeth Correa, [facility]  08/26/24 1811    "

## 2024-08-26 NOTE — SUBJECTIVE & OBJECTIVE
Past Medical History:   Diagnosis Date    Alcoholism        Past Surgical History:   Procedure Laterality Date    NO PAST SURGERIES         Review of patient's allergies indicates:  No Known Allergies    No current facility-administered medications on file prior to encounter.     No current outpatient medications on file prior to encounter.     Family History       Problem Relation (Age of Onset)    Hypertension Mother          Tobacco Use    Smoking status: Every Day     Current packs/day: 1.00     Average packs/day: 1 pack/day for 0.7 years (0.7 ttl pk-yrs)     Types: Cigarettes     Start date: 1/1/2024    Smokeless tobacco: Not on file   Substance and Sexual Activity    Alcohol use: Yes     Comment: occasionally    Drug use: Not on file    Sexual activity: Not on file     Review of Systems   Constitutional:  Negative for activity change and appetite change.   HENT:  Negative for congestion and dental problem.    Eyes:  Negative for discharge and itching.   Respiratory:  Negative for shortness of breath.    Cardiovascular:  Negative for chest pain.   Gastrointestinal:  Negative for abdominal distention and abdominal pain.   Endocrine: Negative for cold intolerance.   Genitourinary:  Negative for difficulty urinating and dysuria.   Musculoskeletal:  Negative for arthralgias and back pain.   Skin:  Negative for color change.   Neurological:  Negative for dizziness and facial asymmetry.   Hematological:  Negative for adenopathy.   Psychiatric/Behavioral:  Negative for agitation and behavioral problems.      Objective:     Vital Signs (Most Recent):  Temp: 97.2 °F (36.2 °C) (08/26/24 1025)  Pulse: 81 (08/26/24 1530)  Resp: 13 (08/26/24 1500)  BP: 132/63 (08/26/24 1530)  SpO2: 96 % (08/26/24 1530) Vital Signs (24h Range):  Temp:  [97.2 °F (36.2 °C)] 97.2 °F (36.2 °C)  Pulse:  [77-98] 81  Resp:  [13-18] 13  SpO2:  [94 %-100 %] 96 %  BP: (104-182)/(56-80) 132/63     Weight: 77.1 kg (169 lb 15.6 oz)  Body mass index is  23.71 kg/m².     Physical Exam  Vitals and nursing note reviewed.   Constitutional:       Appearance: He is well-developed.   HENT:      Head: Atraumatic.      Right Ear: External ear normal.      Left Ear: External ear normal.      Nose: Nose normal.      Mouth/Throat:      Mouth: Mucous membranes are dry.   Cardiovascular:      Rate and Rhythm: Normal rate.   Pulmonary:      Effort: Pulmonary effort is normal.   Abdominal:      Palpations: Abdomen is soft.   Musculoskeletal:         General: Normal range of motion.      Cervical back: Full passive range of motion without pain and normal range of motion.   Skin:     General: Skin is warm.   Neurological:      Mental Status: He is alert and oriented to person, place, and time.   Psychiatric:         Behavior: Behavior normal.                Significant Labs: All pertinent labs within the past 24 hours have been reviewed.  CBC:   Recent Labs   Lab 08/26/24  1051   WBC 10.17   HGB 15.0   HCT 46.6        CMP:   Recent Labs   Lab 08/26/24  1051      K 4.3      CO2 23   *   BUN 16   CREATININE 1.0   CALCIUM 9.0   PROT 6.9   ALBUMIN 4.1   BILITOT 0.3   ALKPHOS 67   AST 15   ALT 10   ANIONGAP 11       Significant Imaging: I have reviewed all pertinent imaging results/findings within the past 24 hours.

## 2024-08-26 NOTE — HPI
58 year old pt getting admitted with suspected seizures'  Pt drinks one pint of vodka everyday  Also has been using crystal meth everyday for past 9 years  Said he quit taking crystal meth for more than 1 week  But today UDS is positive again for amphetamines  He was taking OTC stackers/caffeine drinks for past few days to stay awake  Yesterday he passed out and this was witnessed by Girlfriend  EMS was called but pt refused ER visit  Toady he was found to be more sleepy and EMS was again called  Pt was escorted to ER and will be admitted to r/o seizures   Pt doesn't recall most of the events happened yesterday and today

## 2024-08-26 NOTE — Clinical Note
Diagnosis: Seizures [205091]   Future Attending Provider: DENYS PASCAL [[id number]]   Place in Observation: [facility] [0810]   Special Needs:: No Special Needs [1]

## 2024-08-27 VITALS
TEMPERATURE: 99 F | WEIGHT: 174.38 LBS | DIASTOLIC BLOOD PRESSURE: 79 MMHG | RESPIRATION RATE: 18 BRPM | BODY MASS INDEX: 23.62 KG/M2 | SYSTOLIC BLOOD PRESSURE: 143 MMHG | HEIGHT: 72 IN | OXYGEN SATURATION: 97 % | HEART RATE: 82 BPM

## 2024-08-27 LAB
ALBUMIN SERPL BCP-MCNC: 3.6 G/DL (ref 3.5–5.2)
ALP SERPL-CCNC: 59 U/L (ref 55–135)
ALT SERPL W/O P-5'-P-CCNC: 10 U/L (ref 10–44)
ANION GAP SERPL CALC-SCNC: 5 MMOL/L (ref 8–16)
AST SERPL-CCNC: 11 U/L (ref 10–40)
BASOPHILS # BLD AUTO: 0.06 K/UL (ref 0–0.2)
BASOPHILS NFR BLD: 0.8 % (ref 0–1.9)
BILIRUB SERPL-MCNC: 0.2 MG/DL (ref 0.1–1)
BUN SERPL-MCNC: 16 MG/DL (ref 6–20)
CALCIUM SERPL-MCNC: 8.5 MG/DL (ref 8.7–10.5)
CHLORIDE SERPL-SCNC: 111 MMOL/L (ref 95–110)
CO2 SERPL-SCNC: 27 MMOL/L (ref 23–29)
CREAT SERPL-MCNC: 0.8 MG/DL (ref 0.5–1.4)
DIFFERENTIAL METHOD BLD: NORMAL
EOSINOPHIL # BLD AUTO: 0.2 K/UL (ref 0–0.5)
EOSINOPHIL NFR BLD: 2.6 % (ref 0–8)
ERYTHROCYTE [DISTWIDTH] IN BLOOD BY AUTOMATED COUNT: 13.9 % (ref 11.5–14.5)
EST. GFR  (NO RACE VARIABLE): >60 ML/MIN/1.73 M^2
GLUCOSE SERPL-MCNC: 111 MG/DL (ref 70–110)
HCT VFR BLD AUTO: 44 % (ref 40–54)
HGB BLD-MCNC: 14.2 G/DL (ref 14–18)
IMM GRANULOCYTES # BLD AUTO: 0.02 K/UL (ref 0–0.04)
IMM GRANULOCYTES NFR BLD AUTO: 0.3 % (ref 0–0.5)
LYMPHOCYTES # BLD AUTO: 2.4 K/UL (ref 1–4.8)
LYMPHOCYTES NFR BLD: 32.8 % (ref 18–48)
MAGNESIUM SERPL-MCNC: 2 MG/DL (ref 1.6–2.6)
MCH RBC QN AUTO: 30.5 PG (ref 27–31)
MCHC RBC AUTO-ENTMCNC: 32.3 G/DL (ref 32–36)
MCV RBC AUTO: 94 FL (ref 82–98)
MONOCYTES # BLD AUTO: 0.5 K/UL (ref 0.3–1)
MONOCYTES NFR BLD: 7 % (ref 4–15)
NEUTROPHILS # BLD AUTO: 4.1 K/UL (ref 1.8–7.7)
NEUTROPHILS NFR BLD: 56.5 % (ref 38–73)
NRBC BLD-RTO: 0 /100 WBC
PLATELET # BLD AUTO: 322 K/UL (ref 150–450)
PMV BLD AUTO: 10.1 FL (ref 9.2–12.9)
POTASSIUM SERPL-SCNC: 4 MMOL/L (ref 3.5–5.1)
PROT SERPL-MCNC: 6 G/DL (ref 6–8.4)
RBC # BLD AUTO: 4.66 M/UL (ref 4.6–6.2)
SODIUM SERPL-SCNC: 143 MMOL/L (ref 136–145)
WBC # BLD AUTO: 7.32 K/UL (ref 3.9–12.7)

## 2024-08-27 PROCEDURE — 83735 ASSAY OF MAGNESIUM: CPT | Performed by: INTERNAL MEDICINE

## 2024-08-27 PROCEDURE — 85025 COMPLETE CBC W/AUTO DIFF WBC: CPT | Performed by: INTERNAL MEDICINE

## 2024-08-27 PROCEDURE — 36415 COLL VENOUS BLD VENIPUNCTURE: CPT | Performed by: INTERNAL MEDICINE

## 2024-08-27 PROCEDURE — G0378 HOSPITAL OBSERVATION PER HR: HCPCS

## 2024-08-27 PROCEDURE — 63600175 PHARM REV CODE 636 W HCPCS: Performed by: INTERNAL MEDICINE

## 2024-08-27 PROCEDURE — 80053 COMPREHEN METABOLIC PANEL: CPT | Performed by: INTERNAL MEDICINE

## 2024-08-27 PROCEDURE — 95819 EEG AWAKE AND ASLEEP: CPT

## 2024-08-27 PROCEDURE — 25000003 PHARM REV CODE 250: Performed by: INTERNAL MEDICINE

## 2024-08-27 PROCEDURE — 96376 TX/PRO/DX INJ SAME DRUG ADON: CPT

## 2024-08-27 RX ORDER — ASPIRIN 81 MG/1
81 TABLET ORAL DAILY
Qty: 90 TABLET | Refills: 3 | Status: SHIPPED | OUTPATIENT
Start: 2024-08-27 | End: 2025-08-27

## 2024-08-27 RX ORDER — ATORVASTATIN CALCIUM 40 MG/1
40 TABLET, FILM COATED ORAL DAILY
Qty: 90 TABLET | Refills: 3 | Status: SHIPPED | OUTPATIENT
Start: 2024-08-27 | End: 2025-08-27

## 2024-08-27 RX ADMIN — FOLIC ACID: 5 INJECTION, SOLUTION INTRAMUSCULAR; INTRAVENOUS; SUBCUTANEOUS at 09:08

## 2024-08-27 RX ADMIN — FAMOTIDINE 20 MG: 20 TABLET ORAL at 08:08

## 2024-08-27 NOTE — PROCEDURES
EEG REPORT    NAME: Chepe Goddard  : 1965  MRN: [id number]    DATE of EE2024    CLINICAL INDICATION: This is a 58 y.o. male with history of and drug use being evaluated for new onset seizure.    MEDICATIONS:    0.9% NaCl 1,000 mL with mvi, (ADULT) no.4 with vit K 3,300 unit- 150 mcg/10 mL 10 mL, thiamine 100 mg, folic acid 1 mg infusion   Intravenous Daily    famotidine  20 mg Oral BID         EEG DESCRIPTION:  A 16-channel EEG was performed with electrode placement in 10/20 International System. Longitudinal bipolar and referential montages were utilized in analysis.    This is a technically adequate study with minor muscle and motion artifacts.    The dominant posterior background rhythm was a well modulated, symmetric, synchronous, reactive, 8 Hz rhythm.    The record was reactive to eye opening and closure. Anterior derivations showed the expected admixture of low-voltage beta and theta frequencies as well as intermittent eye blink artifact.    Drowsiness was characterized by voltage attenuation and lateral eye movements.    Sleep architecture was symmetric and normal.    No epileptiform discharges were recorded. No electrographic or electroclinical seizures were recorded.    IMPRESSION: This is a normal EEG. No lateralizing or epileptiform features are noted. No electrographic or electroclinical seizures are recorded.      Evangelist Saunders MD  Neurology

## 2024-08-27 NOTE — DISCHARGE INSTRUCTIONS
No driving for now, no swimming alone, no climbing high areas, no operation of heavy machinery or working with high risk electricity equipment.     Follow up Neurology in 2 weeks. Call [phone] to make an appointment.

## 2024-08-27 NOTE — NURSING
"Nurses Note -- 4 Eyes      8/27/2024   3:48 AM      Skin assessed during: Admit    Patient refused. Patient educated on reasoning of skin assessment. Patient replies, "No thankyou, I dont have anything, I'm fine."           [] No Altered Skin Integrity Present    []Prevention Measures Documented      [] Yes- Altered Skin Integrity Present or Discovered   [] LDA Added if Not in Epic (Describe Wound)   [] New Altered Skin Integrity was Present on Admit and Documented in LDA   [] Wound Image Taken    Wound Care Consulted? No    Attending Nurse:  Ary Henao RN/Staff Member:            "

## 2024-08-27 NOTE — PLAN OF CARE
[facility]  Initial Discharge Assessment       Primary Care Provider: No, Primary Doctor    Admission Diagnosis: Seizures [R56.9]    Admission Date: 8/26/2024  Expected Discharge Date: 8/27/2024    Transition of Care Barriers: None    Payor: /     Extended Emergency Contact Information  Primary Emergency Contact: Sara Mota  Mobile Phone: [phone]  Relation: Mother  Preferred language: English   needed? No  Secondary Emergency Contact: Ora Goddard  Mobile Phone: [phone]  Relation: Spouse  Preferred language: English   needed? No    Discharge Plan A: Home  Discharge Plan B: Home with family      [facility]  [address]  [place] [address]  Phone: [phone] Fax: [phone]    [id number] - [place], [place] - [address] & [facility]  [address] [facility] [address]  Phone: [phone] Fax: [phone]    Patient assessment completed at bedside. Patient staying with his mother Sara who is his NOK.  Patient currently filling out the Medicaid application, will refer to the [facility] clinic. Patient will use the [facility].  NO dme/hh needs.     Initial Assessment (most recent)       Adult Discharge Assessment - 08/27/24 1045          Discharge Assessment    Assessment Type Discharge Planning Assessment     Confirmed/corrected address, phone number and insurance Yes     Confirmed Demographics Correct on Facesheet     Source of Information family;patient     When was your last doctors appointment? --   years    Reason For Admission seizures     People in Home alone;parent(s)     Facility Arrived From: home     Do you expect to return to your current living situation? Yes     Prior to hospitilization cognitive status: Alert/Oriented     Current cognitive status: Alert/Oriented     Walking or Climbing Stairs Difficulty no     Dressing/Bathing Difficulty no     Equipment Currently  Used at Home none     Readmission within 30 days? No     Patient currently being followed by outpatient case management? No     Do you currently have service(s) that help you manage your care at home? No     Do you take prescription medications? No     Do you have prescription coverage? No     Who is going to help you get home at discharge? mother Sara     How do you get to doctors appointments? family or friend will provide     Are you on dialysis? No     Do you take coumadin? No     Discharge Plan A Home     Discharge Plan B Home with family     DME Needed Upon Discharge  none     Discharge Plan discussed with: Patient;Parent(s)     Transition of Care Barriers None        Physical Activity    On average, how many days per week do you engage in moderate to strenuous exercise (like a brisk walk)? 0 days        Financial Resource Strain    How hard is it for you to pay for the very basics like food, housing, medical care, and heating? Somewhat hard        Housing Stability    In the last 12 months, was there a time when you were not able to pay the mortgage or rent on time? Patient declined     At any time in the past 12 months, were you homeless or living in a shelter (including now)? Patient declined        Transportation Needs    Has the lack of transportation kept you from medical appointments, meetings, work or from getting things needed for daily living? No        Food Insecurity    Within the past 12 months, you worried that your food would run out before you got the money to buy more. Never true     Within the past 12 months, the food you bought just didn't last and you didn't have money to get more. Never true        Stress    Do you feel stress - tense, restless, nervous, or anxious, or unable to sleep at night because your mind is troubled all the time - these days? Not at all        Social Isolation    How often do you feel lonely or isolated from those around you?  Never        Alcohol Use    Q1: How  often do you have a drink containing alcohol? Patient declined     Q2: How many drinks containing alcohol do you have on a typical day when you are drinking? Patient declined     Q3: How often do you have six or more drinks on one occasion? Patient declined        Utilities    In the past 12 months has the electric, gas, oil, or water company threatened to shut off services in your home? No        Health Literacy    How often do you need to have someone help you when you read instructions, pamphlets, or other written material from your doctor or pharmacy? Sometimes

## 2024-08-27 NOTE — HOSPITAL COURSE
Patient was observed by the hospital medicine service.  MRI brain showed no acute intracranial pathology.  Did show remote infarct for which she was started on aspirin and statin.  He was evaluated by Neurology.  EEG was normal.  Antiepileptic was not recommended as this was a 1st episode of seizure and probably provoked in the setting of alcohol/drug.  Patient was educated on abstinence from alcohol and drugs.  He was cleared for discharge by Neurology and we will follow up with Neurology in 2 weeks.

## 2024-08-27 NOTE — PLAN OF CARE
Patient cleared once seen and cleared by Neurology and EEG has been reported.   All meds to be sent to [facility] and delivered to bedside.   Email sent to [facility] for follow up.       08/27/24 1050   Final Note   Assessment Type Final Discharge Note   Anticipated Discharge Disposition Home

## 2024-08-27 NOTE — CONSULTS
Critical access hospital  Department of Neurology  Neurology Consultation Note        PATIENT NAME: Chepe Goddard  MRN: [id number]  CSN: [id number]      TODAY'S DATE: 08/27/2024  ADMIT DATE: 8/26/2024                            CONSULTING PROVIDER: Evangelist Saunders MD  CONSULT REQUESTED BY: Anika Valerio MD      Reason for consult: Seizure      History obtained from chart review, the patient, and family.    HPI: Chepe Goddard is a 58 y.o. male with a history of alcohol abuse, drug use, presents to the hospital after he had a seizure-like episode.  Patient's family (mother and daughter) are at bedside helping with history.  On 08/25, patient's mother was grilling and he was sitting outside when she noticed that he all of a sudden had tonic posturing of his extremities with staring up into space and making grunting sounds followed by somewhat shaking of his extremities and then urinary incontinence.  He was confused for about 5 minutes after the episode and then started to come back to his baseline.  On 08/26, he had an episode of dizziness and bumped into a wall after which he had been confused and slow however no seizure-like activity was noted and no incontinence was reported.    No prior history of seizures.  He denies any headache, nausea vomiting, unilateral weakness or sensory changes.    He states that he drinks about a pt of vodka every other day.  He did use crystal meth and states that last was about 2-3 weeks ago denies using it in the last few days.    PREVIOUS MEDICAL HISTORY:  Past Medical History:   Diagnosis Date    Alcoholism      PREVIOUS SURGICAL HISTORY:  Past Surgical History:   Procedure Laterality Date    NO PAST SURGERIES       FAMILY MEDICAL HISTORY:  Family History   Problem Relation Name Age of Onset    Hypertension Mother       ALLERGIES:  Review of patient's allergies indicates:  No Known Allergies  HOME MEDICATIONS:  Prior to Admission medications    Medication Sig  Start Date End Date Taking? Authorizing Provider   aspirin (ECOTRIN) 81 MG EC tablet Take 1 tablet (81 mg total) by mouth once daily. 8/27/24 8/27/25  Anika Valerio MD   atorvastatin (LIPITOR) 40 MG tablet Take 1 tablet (40 mg total) by mouth once daily. 8/27/24 8/27/25  Anika Valerio MD     CURRENT SCHEDULED MEDICATIONS:   0.9% NaCl 1,000 mL with mvi, (ADULT) no.4 with vit K 3,300 unit- 150 mcg/10 mL 10 mL, thiamine 100 mg, folic acid 1 mg infusion   Intravenous Daily    famotidine  20 mg Oral BID     CURRENT INFUSIONS:    CURRENT PRN MEDICATIONS:    Current Facility-Administered Medications:     acetaminophen, 650 mg, Oral, Q4H PRN    aluminum-magnesium hydroxide-simethicone, 30 mL, Oral, QID PRN    HYDROcodone-acetaminophen, 1 tablet, Oral, Q6H PRN    lorazepam, 1 mg, Intravenous, Q2H PRN    magnesium oxide, 800 mg, Oral, PRN    magnesium oxide, 800 mg, Oral, PRN    melatonin, 6 mg, Oral, Nightly PRN    naloxone, 0.02 mg, Intravenous, PRN    ondansetron, 4 mg, Intravenous, Q6H PRN    potassium bicarbonate, 35 mEq, Oral, PRN    potassium bicarbonate, 50 mEq, Oral, PRN    potassium bicarbonate, 60 mEq, Oral, PRN    potassium, sodium phosphates, 2 packet, Oral, PRN    potassium, sodium phosphates, 2 packet, Oral, PRN    potassium, sodium phosphates, 2 packet, Oral, PRN    REVIEW OF SYSTEMS:  Please refer to the HPI for all pertinent positive and negative findings. A comprehensive review of all other systems was negative.    PHYSICAL EXAM:  Patient Vitals for the past 24 hrs:   BP Temp Temp src Pulse Resp SpO2 Height Weight   08/27/24 0744 133/83 98.3 °F (36.8 °C) Oral 63 18 98 % -- --   08/27/24 0554 -- -- -- -- -- -- 6' (1.829 m) 79.1 kg (174 lb 6.1 oz)   08/27/24 0342 (!) 148/82 98 °F (36.7 °C) -- 78 17 99 % -- --   08/26/24 2200 (!) 154/85 97.8 °F (36.6 °C) -- 83 18 98 % -- --   08/26/24 2033 137/72 -- -- 104 -- 97 % -- --   08/26/24 1830 (!) 140/66 -- -- 90 13 95 % -- --   08/26/24 1800 134/65 -- -- 79  "18 97 % -- --   08/26/24 1730 (!) 161/68 -- -- 82 16 95 % -- --   08/26/24 1700 114/66 -- -- 73 -- (!) 94 % -- --   08/26/24 1630 (!) 143/65 -- -- 82 -- 96 % -- --   08/26/24 1600 120/62 -- -- 86 -- 95 % -- --   08/26/24 1530 132/63 -- -- 81 -- 96 % -- --   08/26/24 1500 (!) 182/80 -- -- 81 13 96 % -- --   08/26/24 1430 132/62 -- -- 82 -- 95 % -- --   08/26/24 1400 (!) 143/68 -- -- 90 16 95 % -- --   08/26/24 1330 (!) 142/64 -- -- 77 -- 96 % -- --   08/26/24 1300 137/62 -- -- 82 -- (!) 94 % -- --   08/26/24 1230 132/61 -- -- 85 -- 96 % -- --   08/26/24 1105 (!) 116/56 -- -- 94 18 100 % -- --   08/26/24 1100 (!) 122/56 -- -- 92 16 99 % -- --   08/26/24 1055 129/61 -- -- 86 16 99 % -- --   08/26/24 1054 -- -- -- -- -- -- 5' 11" (1.803 m) 77.1 kg (169 lb 15.6 oz)   08/26/24 1025 104/62 97.2 °F (36.2 °C) Oral 98 18 99 % 5' 11" (1.803 m) 77.1 kg (170 lb)       GENERAL APPEARANCE: Alert, well-developed, well-nourished male in no acute distress.  HEENT: Normocephalic and atraumatic. PERRL. Oropharynx unremarkable.  PULM: Normal respiratory effort. No accessory muscle use.  CV: RRR.  ABDOMEN: Soft, nontender.  EXTREMITIES: No obvious signs of vascular compromise. Pulses present. No cyanosis, clubbing or edema.  SKIN: Clear; no rashes, lesions or skin breaks in exposed areas.    NEURO:  MENTAL STATUS:   , Patient awake and oriented to time, place, and person, recent/remote memory normal, attention span/concentration normal, speech fluent without paraphasic errors, good comprehension with appropriate thought content, and fund of knowledge appropriate for patient's level of education.  Affect euthymic.    CRANIAL NERVES:  CN I: Not tested.  CN II: Fundoscopic exam deferred.  CN III, IV, VI: Pupils equal, round and reactive to light.  Extraocular movements full and intact.  CN V: Facial sensation normal.  CN VII: Facial asymmetry absent.  CN VIII: Hearing grossly normal and equal bilaterally.  No skew deviation or pathologic " "nystagmus.  CN IX, X: Palate elevates symmetrically. Speech/articulation is clear without dysarthria.  CN XI: Shoulder shrug and chin rotation equal with good strength.  CN XII: Tongue protrusion midline.    MOTOR:  Bulk normal. Tone normal and symmetric throughout.  Abnormal movements absent.  Tremor: none present.  Strength 5/5 throughout.    REFLEXES:  DTRs 2+ throughout.  Plantar response downgoing bilaterally.  SENSATION: grossly intact throughout.  COORDINATION: normal finger-to-nose.  STATION: not tested.  GAIT: not tested.          Labs:  Recent Labs   Lab 08/26/24  1051 08/27/24  0416    143   K 4.3 4.0    111*   CO2 23 27   BUN 16 16   CREATININE 1.0 0.8   * 111*   CALCIUM 9.0 8.5*   PHOS 2.9  --    MG 2.2 2.0     Recent Labs   Lab 08/26/24  1051 08/27/24  0416   WBC 10.17 7.32   HGB 15.0 14.2   HCT 46.6 44.0    322     Recent Labs   Lab 08/26/24  1051 08/27/24  0416   ALBUMIN 4.1 3.6   PROT 6.9 6.0   BILITOT 0.3 0.2   ALKPHOS 67 59   ALT 10 10   AST 15 11     Lab Results   Component Value Date    INR 0.9 08/26/2024     Lab Results   Component Value Date    TRIG 134 05/21/2005    HDL 68.0 (H) 05/21/2005    CHOLHDL 26.4 05/21/2005     No results found for: "HGBA1C"  No results found for: "PROTEINCSF", "GLUCCSF", "WBCCSF"    Imaging:  I have reviewed and interpreted the pertinent imaging and lab results.      MRI Brain W WO Contrast  Narrative: EXAMINATION:  MRI BRAIN W WO CONTRAST    CLINICAL HISTORY:  Seizure, new-onset, no history of trauma;    TECHNIQUE:  Multiplanar multisequence MR imaging of the brain was performed before and after the administration of 7.5 mL Gadavist intravenous contrast.    COMPARISON:  Head CT 08/26/2024    FINDINGS:  Please note image quality is mildly degraded by patient motion artifact.  The there is no large region of abnormal restricted diffusion to suggest major vascular territory distribution acute infarction. The ventricles are normal in size and " configuration for age without evidence for hydrocephalus or midline shift.  There are no abnormal areas of the gradient susceptibility to suggest parenchymal hemorrhage.  There is a remote lacunar type infarct involving the left caudate body.  There is T2/FLAIR hyperintensity without corresponding restricted diffusion or postcontrast enhancement within the supratentorial white matter, nonspecific but likely reflecting sequela of chronic microvascular ischemic change.  Following the administration of IV contrast, no pathologic foci of enhancement are identified allowing for patient motion artifact.  No extra-axial hemorrhage or fluid collections.  The craniocervical junction and sellar regions are within normal limits.  Impression: 1. No acute intracranial abnormality appreciated on today's exam.  Specifically, no evidence of major vascular territory distribution infarct or enhancing lesion allowing for motion artifact.  2. Findings suggestive of chronic microvascular ischemic change and remote lacunar type infarct involving the left caudate body.    Electronically signed by: Susy Boyle MD  Date:    08/26/2024  Time:    20:48  CT Head Without Contrast  Narrative: EXAMINATION:  CT HEAD WITHOUT CONTRAST    CLINICAL HISTORY:  Neuro deficit, acute, stroke suspected;.    TECHNIQUE:  CMS MANDATED QUALITY DATA - CT RADIATION - 436    All CT scans at this facility utilize dose modulation, iterative reconstruction, and/or weight based dosing when appropriate to reduce radiation dose to as low as reasonably achievable.    Non infusion images were obtained from the skull base to the vertex.    COMPARISON:  None.    FINDINGS:  There is no evidence of intracranial mass, hemorrhage, or midline shift.  The ventricles and sulci are within normal limits.  There are no pathologic extra-axial fluid collections.    There is no evidence of ischemic change or edema.  Cerebellum and brainstem are unremarkable.    The calvarium is  intact.  Impression: 1. Normal CT appearance of the brain.    Electronically signed by: Lidya Calix  Date:    08/26/2024  Time:    11:06  X-Ray Chest PA And Lateral  Narrative: EXAMINATION:  XR CHEST PA AND LATERAL    CLINICAL HISTORY:  pain;Stroke;    FINDINGS:  PA and lateral chest is compared to 06/30/2021 shows normal cardiomediastinal silhouette.    Lungs are clear. Pulmonary vasculature is normal. No acute osseous abnormality.  Impression: Normal chest.    Electronically signed by: Lidya Calix  Date:    08/26/2024  Time:    10:56         ASSESSMENT & PLAN:        New onset seizure  Alcohol abuse   Drug use    Plan:   Patient presented to the hospital after an episode of seizure.  Likely seizure provoked in the setting of alcohol use/withdrawal/drug use.  Tox screen was positive for amphetamines, marijuana and alcohol level was negative.    Educated about abstinence from alcohol and drugs.  Monitor for alcohol withdrawal.  [facility] protocol.    Will hold off on antiseizure medications at this time as this is a 1st episode of seizure and probably provoked in the setting of alcohol/drug  MRI brain with and without contrast was negative for acute intracranial pathology  EEG ordered and pending.    Seizure precautions while inpatient.    Avoid medications that lower seizure threshold.    Management of metabolic derangements and infectious abnormalities that could lower seizure threshold per primary team.    Will follow on the EEG.  Outpatient neurology follow-up.    Seizure education provided.      No driving for now, no swimming alone, no climbing high areas, no operation of heavy machinery or working with high risk electricity equipment.     Follow up Neurology in 2 weeks. Call [phone] to make an appointment.      Thank you kindly for including us in the care of this patient. Please do not hesitate to contact us with any questions.           Evangelist Saunders MD  Neurology/vascular Neurology  Date of  Service: 08/27/2024  9:49 AM    --------------------------------------------------------------------------------------------------------------------------------------------------------------------------------------------------------------------------------------------------------------  Please note: This note was transcribed using voice recognition software. Because of this technology there are often uinintended grammatical, spelling, and other transcription errors. Please disregard these errors.

## 2024-08-27 NOTE — DISCHARGE SUMMARY
[facility] Medicine  Discharge Summary      Patient Name: Chepe Goddard  MRN: [id number]  QUINN: [id number]  Patient Class: OP- Observation  Admission Date: 8/26/2024  Hospital Length of Stay: 0 days  Discharge Date and Time: No discharge date for patient encounter.  Attending Physician: Anika Valerio MD   Discharging Provider: Anika Valerio MD  Primary Care Provider: Armida, Primary Doctor    Primary Care Team: Networked reference to record PCT     HPI:   58 year old pt getting admitted with suspected seizures'  Pt drinks one pint of vodka everyday  Also has been using crystal meth everyday for past 9 years  Said he quit taking crystal meth for more than 1 week  But today UDS is positive again for amphetamines  He was taking OTC stackers/caffeine drinks for past few days to stay awake  Yesterday he passed out and this was witnessed by Girlfriend  EMS was called but pt refused ER visit  Toady he was found to be more sleepy and EMS was again called  Pt was escorted to ER and will be admitted to r/o seizures   Pt doesn't recall most of the events happened yesterday and today    * No surgery found *      Hospital Course:   Patient was observed by the hospital medicine service.  MRI brain showed no acute intracranial pathology.  Did show remote infarct for which she was started on aspirin and statin.  He was evaluated by Neurology.  EEG was normal.  Antiepileptic was not recommended as this was a 1st episode of seizure and probably provoked in the setting of alcohol/drug.  Patient was educated on abstinence from alcohol and drugs.  He was cleared for discharge by Neurology and we will follow up with Neurology in 2 weeks.     Goals of Care Treatment Preferences:  Code Status: Full Code         Consults:   Consults (From admission, onward)          Status Ordering Provider     Inpatient consult to Neurology  Once        Provider:  Evangelist Saunders MD    Completed KOBI GONZALEZ             No new Assessment & Plan notes have been filed under this hospital service since the last note was generated.  Service: Hospital Medicine    Final Active Diagnoses:    Diagnosis Date Noted POA    PRINCIPAL PROBLEM:  Seizures [R56.9] 08/26/2024 Yes    Alcoholism [F10.20] 08/26/2024 Yes    Polysubstance abuse [F19.10] 08/26/2024 Yes      Problems Resolved During this Admission:       Discharged Condition: good    Disposition: Home or Self Care    Follow Up:   Follow-up Information       Evangelist Saunders MD Follow up in 2 week(s).    Specialty: Neurology  Why: Follow up Neurology in 2 weeks. Call [phone] to make an appointment.  Contact information:  [address] [place] [address] [phone]               [facility] Follow up.    Why: they will call you to schedule a hospital follow up.  Contact information:  [place] [address]  [phone]                           Patient Instructions:      Notify your health care provider if you experience any of the following:  temperature >100.4     Notify your health care provider if you experience any of the following:  persistent nausea and vomiting or diarrhea     Notify your health care provider if you experience any of the following:  severe uncontrolled pain     Notify your health care provider if you experience any of the following:  persistent dizziness, light-headedness, or visual disturbances     Notify your health care provider if you experience any of the following:  increased confusion or weakness     Activity as tolerated       Significant Diagnostic Studies: Labs: BMP:   Recent Labs   Lab 08/26/24  1051 08/27/24  0416   * 111*    143   K 4.3 4.0    111*   CO2 23 27   BUN 16 16   CREATININE 1.0 0.8   CALCIUM 9.0 8.5*   MG 2.2 2.0    and CBC   Recent Labs   Lab 08/26/24  1051 08/27/24  0416   WBC 10.17 7.32   HGB 15.0 14.2   HCT 46.6 44.0    322     Radiology Results  (last 7 days)    Procedure Component Value Units Date/Time   MRI Brain W WO Contrast [[id number]] Resulted: 08/26/24 2048   Order Status: Completed Updated: 08/26/24 2050   Narrative:     EXAMINATION:  MRI BRAIN W WO CONTRAST    CLINICAL HISTORY:  Seizure, new-onset, no history of trauma;    TECHNIQUE:  Multiplanar multisequence MR imaging of the brain was performed before and after the administration of 7.5 mL Gadavist intravenous contrast.    COMPARISON:  Head CT 08/26/2024    FINDINGS:  Please note image quality is mildly degraded by patient motion artifact.  The there is no large region of abnormal restricted diffusion to suggest major vascular territory distribution acute infarction. The ventricles are normal in size and configuration for age without evidence for hydrocephalus or midline shift.  There are no abnormal areas of the gradient susceptibility to suggest parenchymal hemorrhage.  There is a remote lacunar type infarct involving the left caudate body.  There is T2/FLAIR hyperintensity without corresponding restricted diffusion or postcontrast enhancement within the supratentorial white matter, nonspecific but likely reflecting sequela of chronic microvascular ischemic change.  Following the administration of IV contrast, no pathologic foci of enhancement are identified allowing for patient motion artifact.  No extra-axial hemorrhage or fluid collections.  The craniocervical junction and sellar regions are within normal limits.   Impression:       1. No acute intracranial abnormality appreciated on today's exam.  Specifically, no evidence of major vascular territory distribution infarct or enhancing lesion allowing for motion artifact.  2. Findings suggestive of chronic microvascular ischemic change and remote lacunar type infarct involving the left caudate body.      Electronically signed by: Susy Boyle MD  Date: 08/26/2024  Time: 20:48   CT Head Without Contrast [[id number]] Resulted: 08/26/24 1106    Order Status: Completed Updated: 08/26/24 1109   Narrative:     EXAMINATION:  CT HEAD WITHOUT CONTRAST    CLINICAL HISTORY:  Neuro deficit, acute, stroke suspected;.    TECHNIQUE:  CMS MANDATED QUALITY DATA - CT RADIATION - 436    All CT scans at this facility utilize dose modulation, iterative reconstruction, and/or weight based dosing when appropriate to reduce radiation dose to as low as reasonably achievable.    Non infusion images were obtained from the skull base to the vertex.    COMPARISON:  None.    FINDINGS:  There is no evidence of intracranial mass, hemorrhage, or midline shift.  The ventricles and sulci are within normal limits.  There are no pathologic extra-axial fluid collections.    There is no evidence of ischemic change or edema.  Cerebellum and brainstem are unremarkable.    The calvarium is intact.   Impression:       1. Normal CT appearance of the brain.      Electronically signed by: Lidya Calix  Date: 08/26/2024  Time: 11:06   X-Ray Chest PA And Lateral [[id number]] Resulted: 08/26/24 1056   Order Status: Completed Updated: 08/26/24 1058   Narrative:     EXAMINATION:  XR CHEST PA AND LATERAL    CLINICAL HISTORY:  pain;Stroke;    FINDINGS:  PA and lateral chest is compared to 06/30/2021 shows normal cardiomediastinal silhouette.    Lungs are clear. Pulmonary vasculature is normal. No acute osseous abnormality.   Impression:       Normal chest.      Electronically signed by: Lidya Calix  Date: 08/26/2024  Time: 10:56     Pending Diagnostic Studies:       None           Medications:  Reconciled Home Medications:      Medication List        START taking these medications      aspirin 81 MG EC tablet  Commonly known as: ECOTRIN  Take 1 tablet (81 mg total) by mouth once daily.     atorvastatin 40 MG tablet  Commonly known as: LIPITOR  Take 1 tablet (40 mg total) by mouth once daily.              Indwelling Lines/Drains at time of discharge:   Lines/Drains/Airways       None                    Time spent on the discharge of patient: 35 minutes         Anika Valerio MD  Department of Hospital Medicine  [facility]

## 2024-08-27 NOTE — NURSING
IV removed without difficulty, catheter intact. Discharge instructions reviewed with and given to patient. Verbalized understanding. Patient instructed to notify staff when family arrives so pharmacy can be called to deliver discharge medications. Pharmacy is awaiting payment.

## 2024-08-31 LAB
OHS QRS DURATION: 84 MS
OHS QTC CALCULATION: 447 MS

## 2025-01-02 ENCOUNTER — HOSPITAL ENCOUNTER (EMERGENCY)
Facility: HOSPITAL | Age: 60
Discharge: LEFT BEFORE DISCHARGE | End: 2025-01-02
Attending: EMERGENCY MEDICINE

## 2025-01-02 VITALS
HEIGHT: 72 IN | DIASTOLIC BLOOD PRESSURE: 97 MMHG | SYSTOLIC BLOOD PRESSURE: 155 MMHG | WEIGHT: 150 LBS | BODY MASS INDEX: 20.32 KG/M2 | OXYGEN SATURATION: 99 % | RESPIRATION RATE: 16 BRPM | TEMPERATURE: 98 F | HEART RATE: 100 BPM

## 2025-01-02 DIAGNOSIS — R06.02 SOB (SHORTNESS OF BREATH): ICD-10-CM

## 2025-01-02 DIAGNOSIS — F10.930 ALCOHOL WITHDRAWAL SYNDROME WITHOUT COMPLICATION: Primary | ICD-10-CM

## 2025-01-02 LAB
ALBUMIN SERPL BCP-MCNC: 4.3 G/DL (ref 3.5–5.2)
ALP SERPL-CCNC: 51 U/L (ref 55–135)
ALT SERPL W/O P-5'-P-CCNC: 15 U/L (ref 10–44)
ANION GAP SERPL CALC-SCNC: 5 MMOL/L (ref 8–16)
AST SERPL-CCNC: 14 U/L (ref 10–40)
BASOPHILS # BLD AUTO: 0.05 K/UL (ref 0–0.2)
BASOPHILS NFR BLD: 0.7 % (ref 0–1.9)
BILIRUB SERPL-MCNC: 0.5 MG/DL (ref 0.1–1)
BNP SERPL-MCNC: 12 PG/ML (ref 0–99)
BUN SERPL-MCNC: 14 MG/DL (ref 6–20)
CALCIUM SERPL-MCNC: 9.4 MG/DL (ref 8.7–10.5)
CHLORIDE SERPL-SCNC: 107 MMOL/L (ref 95–110)
CO2 SERPL-SCNC: 25 MMOL/L (ref 23–29)
CREAT SERPL-MCNC: 0.9 MG/DL (ref 0.5–1.4)
D DIMER PPP IA.FEU-MCNC: 0.19 MG/L FEU (ref 0–0.49)
DIFFERENTIAL METHOD BLD: NORMAL
EOSINOPHIL # BLD AUTO: 0 K/UL (ref 0–0.5)
EOSINOPHIL NFR BLD: 0.4 % (ref 0–8)
ERYTHROCYTE [DISTWIDTH] IN BLOOD BY AUTOMATED COUNT: 13 % (ref 11.5–14.5)
EST. GFR  (NO RACE VARIABLE): >60 ML/MIN/1.73 M^2
GLUCOSE SERPL-MCNC: 93 MG/DL (ref 70–110)
HCT VFR BLD AUTO: 47.9 % (ref 40–54)
HCV AB SERPL QL IA: NEGATIVE
HGB BLD-MCNC: 16.1 G/DL (ref 14–18)
HIV 1+2 AB+HIV1 P24 AG SERPL QL IA: NEGATIVE
IMM GRANULOCYTES # BLD AUTO: 0.03 K/UL (ref 0–0.04)
IMM GRANULOCYTES NFR BLD AUTO: 0.4 % (ref 0–0.5)
INR PPP: 1 (ref 0.8–1.2)
LYMPHOCYTES # BLD AUTO: 1.6 K/UL (ref 1–4.8)
LYMPHOCYTES NFR BLD: 22.9 % (ref 18–48)
MCH RBC QN AUTO: 30 PG (ref 27–31)
MCHC RBC AUTO-ENTMCNC: 33.6 G/DL (ref 32–36)
MCV RBC AUTO: 89 FL (ref 82–98)
MONOCYTES # BLD AUTO: 0.5 K/UL (ref 0.3–1)
MONOCYTES NFR BLD: 7.9 % (ref 4–15)
NEUTROPHILS # BLD AUTO: 4.6 K/UL (ref 1.8–7.7)
NEUTROPHILS NFR BLD: 67.7 % (ref 38–73)
NRBC BLD-RTO: 0 /100 WBC
PLATELET # BLD AUTO: 356 K/UL (ref 150–450)
PMV BLD AUTO: 9.8 FL (ref 9.2–12.9)
POCT GLUCOSE: 96 MG/DL (ref 70–110)
POTASSIUM SERPL-SCNC: 4 MMOL/L (ref 3.5–5.1)
PROT SERPL-MCNC: 6.7 G/DL (ref 6–8.4)
PROTHROMBIN TIME: 10.7 SEC (ref 9–12.5)
RBC # BLD AUTO: 5.36 M/UL (ref 4.6–6.2)
SODIUM SERPL-SCNC: 137 MMOL/L (ref 136–145)
TROPONIN I SERPL HS-MCNC: 4.9 PG/ML (ref 0–14.9)
WBC # BLD AUTO: 6.85 K/UL (ref 3.9–12.7)

## 2025-01-02 PROCEDURE — 83880 ASSAY OF NATRIURETIC PEPTIDE: CPT | Performed by: NURSE PRACTITIONER

## 2025-01-02 PROCEDURE — 93010 ELECTROCARDIOGRAM REPORT: CPT | Mod: ,,, | Performed by: INTERNAL MEDICINE

## 2025-01-02 PROCEDURE — 84484 ASSAY OF TROPONIN QUANT: CPT | Performed by: NURSE PRACTITIONER

## 2025-01-02 PROCEDURE — 85025 COMPLETE CBC W/AUTO DIFF WBC: CPT | Performed by: NURSE PRACTITIONER

## 2025-01-02 PROCEDURE — 99285 EMERGENCY DEPT VISIT HI MDM: CPT | Mod: 25

## 2025-01-02 PROCEDURE — 87389 HIV-1 AG W/HIV-1&-2 AB AG IA: CPT | Performed by: EMERGENCY MEDICINE

## 2025-01-02 PROCEDURE — 25000003 PHARM REV CODE 250: Performed by: EMERGENCY MEDICINE

## 2025-01-02 PROCEDURE — 93005 ELECTROCARDIOGRAM TRACING: CPT | Performed by: INTERNAL MEDICINE

## 2025-01-02 PROCEDURE — 86803 HEPATITIS C AB TEST: CPT | Performed by: EMERGENCY MEDICINE

## 2025-01-02 PROCEDURE — 85379 FIBRIN DEGRADATION QUANT: CPT | Performed by: EMERGENCY MEDICINE

## 2025-01-02 PROCEDURE — 36415 COLL VENOUS BLD VENIPUNCTURE: CPT | Performed by: EMERGENCY MEDICINE

## 2025-01-02 PROCEDURE — 80053 COMPREHEN METABOLIC PANEL: CPT | Performed by: NURSE PRACTITIONER

## 2025-01-02 PROCEDURE — 85610 PROTHROMBIN TIME: CPT | Performed by: NURSE PRACTITIONER

## 2025-01-02 RX ORDER — CHLORDIAZEPOXIDE HYDROCHLORIDE 25 MG/1
50 CAPSULE, GELATIN COATED ORAL
Status: COMPLETED | OUTPATIENT
Start: 2025-01-02 | End: 2025-01-02

## 2025-01-02 RX ORDER — CHLORDIAZEPOXIDE HYDROCHLORIDE 25 MG/1
CAPSULE, GELATIN COATED ORAL
Qty: 21 CAPSULE | Refills: 0 | Status: SHIPPED | OUTPATIENT
Start: 2025-01-02 | End: 2025-01-16

## 2025-01-02 RX ADMIN — CHLORDIAZEPOXIDE HYDROCHLORIDE 50 MG: 25 CAPSULE ORAL at 02:01

## 2025-01-02 NOTE — FIRST PROVIDER EVALUATION
Emergency Department TeleTriage Encounter Note      CHIEF COMPLAINT    Chief Complaint   Patient presents with    Shortness of Breath     Started x2 days ago, worse today. Reports Can't control shakes     Dizziness       VITAL SIGNS   Initial Vitals [01/02/25 1154]   BP Pulse Resp Temp SpO2   (!) 159/98 (!) 117 20 97.6 °F (36.4 °C) 99 %      MAP       --            ALLERGIES    Review of patient's allergies indicates:  No Known Allergies    PROVIDER TRIAGE NOTE  Verbal consent for the teletriage evaluation was given by the patient at the start of the evaluation.  All efforts will be made to maintain patient's privacy during the evaluation.      This is a teletriage evaluation of a 59 y.o. male presenting to the ED with c/o dizziness and SOB that started 2 days PTA and is worsening. Limited physical exam via telehealth: The patient is awake, alert, answering questions appropriately and is not in respiratory distress.  As the Teletriage provider, I performed an initial assessment and ordered appropriate labs and imaging studies, if any, to facilitate the patient's care once placed in the ED. Once a room is available, care and a full evaluation will be completed by an alternate ED provider.  Any additional orders and the final disposition will be determined by that provider.  All imaging and labs will not be followed-up by the Teletriage Team, including myself.          ORDERS  Labs Reviewed   HEPATITIS C ANTIBODY   HIV 1 / 2 ANTIBODY       ED Orders (720h ago, onward)      Start Ordered     Status Ordering Provider    01/02/25 1205 01/02/25 1204  POCT glucose  Once         Ordered ABHAY DUONG    01/02/25 1205 01/02/25 1204  Urinalysis, Reflex to Urine Culture Urine, Clean Catch  STAT         Ordered ABHAY DUONG    01/02/25 1205 01/02/25 1204  Protime-INR  STAT         Ordered ABHAY DUONG    01/02/25 1205 01/02/25 1204  Troponin I High Sensitivity  STAT         Ordered ABHAY DUONG    01/02/25 1205 01/02/25  1204  B-Type natriuretic peptide (BNP)  STAT         Ordered ABHAY DUONG    01/02/25 1204 01/02/25 1204  Saline lock IV  Once         Ordered ABHAY DUONG    01/02/25 1204 01/02/25 1204  Orthostatic blood pressure  Once         Ordered AD, ABHAY CARUSO    01/02/25 1204 01/02/25 1204  Pulse Oximetry Continuous  Continuous         Ordered AD, ABHAY CARUSO    01/02/25 1204 01/02/25 1204  Cardiac Monitoring - Adult  Continuous        Comments: Notify Physician If:    Ordered ADABHAY    01/02/25 1204 01/02/25 1204  EKG 12-lead  Once         Ordered AD, ABHAY CARUSO    01/02/25 1204 01/02/25 1204  CBC auto differential  STAT         Ordered ABHAY DUONG    01/02/25 1204 01/02/25 1204  Comprehensive metabolic panel  STAT         Ordered ADABHAY SERRATO    01/02/25 1204 01/02/25 1204  X-Ray Chest PA And Lateral  1 time imaging         Ordered ABHAY DUONG    01/02/25 1157 01/02/25 1156  Hepatitis C Antibody  STAT         Ordered RONEN FRANCO    01/02/25 1157 01/02/25 1156  HIV 1/2 Ag/Ab (4th Gen)  STAT         Ordered RONEN FRANCO              Virtual Visit Note: The provider triage portion of this emergency department evaluation and documentation was performed via [employer], a HIPAA-compliant telemedicine application, in concert with a tele-presenter in the room. A face to face patient evaluation with one of my colleagues will occur once the patient is placed in an emergency department room.      DISCLAIMER: This note was prepared with [employer] voice recognition transcription software. Garbled syntax, mangled pronouns, and other bizarre constructions may be attributed to that software system.

## 2025-01-02 NOTE — ED NOTES
01/02/25 1229 01/02/25 1229   Vital Signs   Pulse 105 (!) 125   Heart Rate Source SpO2 SpO2   Resp 16  --    SpO2 97 % 97 %   BP (!) 150/90 (!) 152/76   BP Location Left arm Left arm   Patient Position Sitting Standing   Assessments (Pre/Post)   Level of Consciousness (AVPU) alert  --

## 2025-01-02 NOTE — ED PROVIDER NOTES
Encounter Date: 1/2/2025       History     Chief Complaint   Patient presents with    Shortness of Breath     Started x2 days ago, worse today. Reports Can't control shakes     Dizziness     HPI patient is a 59-year-old man who presents emergency department for evaluation of 2 days of dyspnea on exertion.  Also reports associated tremors and dizziness.  Reports drinking half a pt of vodka a day.  Last drink was last night.  Review of patient's allergies indicates:  No Known Allergies  Past Medical History:   Diagnosis Date    Alcoholism      Past Surgical History:   Procedure Laterality Date    NO PAST SURGERIES       Family History   Problem Relation Name Age of Onset    Hypertension Mother       Social History     Tobacco Use    Smoking status: Every Day     Current packs/day: 1.00     Average packs/day: 1 pack/day for 1 year (1.0 ttl pk-yrs)     Types: Cigarettes     Start date: 1/1/2024   Substance Use Topics    Alcohol use: Yes     Comment: occasionally     Review of Systems   Constitutional:  Negative for fever.   HENT:  Negative for sore throat.    Respiratory:  Positive for shortness of breath.    Cardiovascular:  Negative for chest pain.   Gastrointestinal:  Negative for nausea.   Genitourinary:  Negative for dysuria.   Musculoskeletal:  Negative for back pain.   Skin:  Negative for rash.   Neurological:  Positive for dizziness and tremors. Negative for weakness.   Hematological:  Does not bruise/bleed easily.       Physical Exam     Initial Vitals [01/02/25 1154]   BP Pulse Resp Temp SpO2   (!) 159/98 (!) 117 20 97.6 °F (36.4 °C) 99 %      MAP       --         Physical Exam    Constitutional: Vital signs are normal. He appears well-developed and well-nourished.  Non-toxic appearance. No distress.   HENT:   Head: Normocephalic and atraumatic.   Eyes: EOM are normal. Pupils are equal, round, and reactive to light.   Neck: Neck supple. No JVD present.   Normal range of motion.  Cardiovascular:  Regular  rhythm, normal heart sounds and intact distal pulses.   Tachycardia present.   Exam reveals no gallop and no friction rub.       No murmur heard.  Pulmonary/Chest: Breath sounds normal. He has no wheezes. He has no rhonchi. He has no rales.   Abdominal: Abdomen is soft. Bowel sounds are normal. There is no abdominal tenderness. There is no rebound and no guarding.   Musculoskeletal:         General: Normal range of motion.      Cervical back: Normal range of motion and neck supple. No rigidity.     Neurological: He is alert and oriented to person, place, and time. He has normal strength and normal reflexes. He displays tremor. No cranial nerve deficit or sensory deficit. He exhibits normal muscle tone. Coordination normal. GCS eye subscore is 4. GCS verbal subscore is 5. GCS motor subscore is 6.   Skin: Skin is warm and dry.   Psychiatric: He has a normal mood and affect. His speech is normal and behavior is normal. He is not actively hallucinating.         ED Course   Procedures  Labs Reviewed   COMPREHENSIVE METABOLIC PANEL - Abnormal       Result Value    Sodium 137      Potassium 4.0      Chloride 107      CO2 25      Glucose 93      BUN 14      Creatinine 0.9      Calcium 9.4      Total Protein 6.7      Albumin 4.3      Total Bilirubin 0.5      Alkaline Phosphatase 51 (*)     AST 14      ALT 15      eGFR >60.0      Anion Gap 5 (*)     Narrative:     Release to patient->Immediate   HEPATITIS C ANTIBODY    Hepatitis C Ab Negative      Narrative:     Release to patient->Immediate   HIV 1 / 2 ANTIBODY    HIV 1/2 Ag/Ab Negative      Narrative:     Release to patient->Immediate   CBC W/ AUTO DIFFERENTIAL    WBC 6.85      RBC 5.36      Hemoglobin 16.1      Hematocrit 47.9      MCV 89      MCH 30.0      MCHC 33.6      RDW 13.0      Platelets 356      MPV 9.8      Immature Granulocytes 0.4      Gran # (ANC) 4.6      Immature Grans (Abs) 0.03      Lymph # 1.6      Mono # 0.5      Eos # 0.0      Baso # 0.05      nRBC 0       Gran % 67.7      Lymph % 22.9      Mono % 7.9      Eosinophil % 0.4      Basophil % 0.7      Differential Method Automated      Narrative:     Release to patient->Immediate   PROTIME-INR    Prothrombin Time 10.7      INR 1.0      Narrative:     Release to patient->Immediate   TROPONIN I HIGH SENSITIVITY    Troponin I High Sensitivity 4.9      Narrative:     Release to patient->Immediate   B-TYPE NATRIURETIC PEPTIDE    BNP 12      Narrative:     Release to patient->Immediate   D DIMER, QUANTITATIVE   D DIMER, QUANTITATIVE    D-Dimer 0.19     POCT GLUCOSE    POCT Glucose 96          ECG Results              EKG 12-lead (In process)        Collection Time Result Time QRS Duration OHS QTC Calculation    01/02/25 12:27:29 01/02/25 16:40:36 76 467                     In process by Interface, Lab In [facility] (01/02/25 16:40:41)                   Narrative:    Test Reason :    Vent. Rate : 111 BPM     Atrial Rate : 111 BPM     P-R Int : 152 ms          QRS Dur :  76 ms      QT Int : 344 ms       P-R-T Axes :  79  91  69 degrees    QTcB Int : 467 ms    Sinus tachycardia  Rightward axis  Borderline Abnormal ECG  When compared with ECG of 02-Jan-2025 11:51,  No significant change was found    Referred By: AAAREFERRAL SELF           Confirmed By:                                      EKG 12-lead (In process)        Collection Time Result Time QRS Duration OHS QTC Calculation    01/02/25 11:51:33 01/02/25 12:06:21 76 456                     In process by Interface, Lab In [facility] (01/02/25 12:06:23)                   Narrative:    Test Reason : R06.02,    Vent. Rate : 118 BPM     Atrial Rate : 118 BPM     P-R Int : 146 ms          QRS Dur :  76 ms      QT Int : 326 ms       P-R-T Axes :  83  85  86 degrees    QTcB Int : 456 ms    Sinus tachycardia  Otherwise normal ECG  No previous ECGs available    Referred By:            Confirmed By:                                   Imaging Results              X-Ray Chest PA And Lateral  (Final result)  Result time 01/02/25 12:56:36      Final result by Andres Gorman MD (01/02/25 12:56:36)                   Impression:      No acute pulmonary process.      Electronically signed by: Andres Gorman  Date:    01/02/2025  Time:    12:56               Narrative:    EXAMINATION:  XR CHEST PA AND LATERAL    CLINICAL HISTORY:  SOB;    COMPARISON:  08/26/2024    FINDINGS:  Cardiomediastinal silhouette is within normal limits.  No pulmonary edema or confluent airspace disease.  No pleural effusion or pneumothorax.  No acute osseous abnormality.                                       Medications   chlordiazepoxide capsule 50 mg (50 mg Oral Given 1/2/25 1403)     Medical Decision Making  59-year-old man who presents emergency department for evaluation of 2 days of dyspnea on exertion.  Also reports associated tremors and dizziness.  Reports drinking half a pt of vodka a day.  Last drink was last night.  Differential diagnosis includes new onset CHF, pneumothorax, COPD, alcohol withdrawal, symptomatic anemia  Patient is not anemic with a hemoglobin of 16 no electrolyte abnormalities or renal failure.  Potassium 4.0 sodium 137.  Troponin is and what 4.9.  No evidence of ACS.  No evidence of heart failure with BNP of 12.  No evidence of pulmonary embolism with D-dimer 0.19 and low risk Wells criteria.  X-ray shows no evidence of pneumothorax, pneumonia or acute abnormalities.  Patient is tremulous and tachycardic, given Librium with resolution of symptoms.  Wishes to go home.  Patient eloped from the ED prior to me going over all of his results  .  Was planning on discharging him with Librium taper for alcohol withdrawal symptoms.    Amount and/or Complexity of Data Reviewed  Labs: ordered.    Risk  Prescription drug management.               ED Course as of 01/02/25 2258   Thu Jan 02, 2025   1533 Patient stating he feels significantly improved and would like to go home. [AS]      ED Course User Index  [AS]  Rj Francis MD                           Clinical Impression:  Final diagnoses:  [R06.02] SOB (shortness of breath)  [F10.930] Alcohol withdrawal syndrome without complication (Primary)          ED Disposition Condition    Eloped                 Rj Francis MD  01/02/25 8854

## 2025-01-05 LAB
OHS QRS DURATION: 76 MS
OHS QRS DURATION: 76 MS
OHS QTC CALCULATION: 456 MS
OHS QTC CALCULATION: 467 MS